# Patient Record
Sex: MALE | Race: WHITE
[De-identification: names, ages, dates, MRNs, and addresses within clinical notes are randomized per-mention and may not be internally consistent; named-entity substitution may affect disease eponyms.]

---

## 2019-01-26 ENCOUNTER — HOSPITAL ENCOUNTER (INPATIENT)
Dept: HOSPITAL 94 - ER | Age: 74
LOS: 8 days | DRG: 207 | End: 2019-02-03
Attending: INTERNAL MEDICINE | Admitting: INTERNAL MEDICINE
Payer: MEDICARE

## 2019-01-26 VITALS — DIASTOLIC BLOOD PRESSURE: 75 MMHG | SYSTOLIC BLOOD PRESSURE: 166 MMHG

## 2019-01-26 VITALS — WEIGHT: 238.32 LBS | HEIGHT: 68 IN | BODY MASS INDEX: 36.12 KG/M2

## 2019-01-26 VITALS — DIASTOLIC BLOOD PRESSURE: 65 MMHG | SYSTOLIC BLOOD PRESSURE: 103 MMHG

## 2019-01-26 VITALS — DIASTOLIC BLOOD PRESSURE: 71 MMHG | SYSTOLIC BLOOD PRESSURE: 156 MMHG

## 2019-01-26 VITALS — DIASTOLIC BLOOD PRESSURE: 62 MMHG | SYSTOLIC BLOOD PRESSURE: 111 MMHG

## 2019-01-26 DIAGNOSIS — I25.10: ICD-10-CM

## 2019-01-26 DIAGNOSIS — R56.9: ICD-10-CM

## 2019-01-26 DIAGNOSIS — S09.90XA: ICD-10-CM

## 2019-01-26 DIAGNOSIS — Z66: ICD-10-CM
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
## 2019-01-26 DIAGNOSIS — I24.9: ICD-10-CM
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
## 2019-01-26 DIAGNOSIS — J96.01: Primary | ICD-10-CM
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
## 2019-01-26 DIAGNOSIS — Z79.84: ICD-10-CM

## 2019-01-26 DIAGNOSIS — I46.9: ICD-10-CM

## 2019-01-26 DIAGNOSIS — M19.90: ICD-10-CM

## 2019-01-26 DIAGNOSIS — E87.6: ICD-10-CM

## 2019-01-26 DIAGNOSIS — Z51.5: ICD-10-CM

## 2019-01-26 DIAGNOSIS — E11.9: ICD-10-CM

## 2019-01-26 DIAGNOSIS — J90: ICD-10-CM
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
## 2019-01-26 DIAGNOSIS — I25.2: ICD-10-CM

## 2019-01-26 DIAGNOSIS — I10: ICD-10-CM

## 2019-01-26 DIAGNOSIS — Z79.899: ICD-10-CM

## 2019-01-26 DIAGNOSIS — Z95.5: ICD-10-CM

## 2019-01-26 DIAGNOSIS — Z79.82: ICD-10-CM

## 2019-01-26 DIAGNOSIS — G93.1: ICD-10-CM

## 2019-01-26 LAB
ALBUMIN SERPL BCP-MCNC: 3.5 G/DL (ref 3.4–5)
ALBUMIN SERPL BCP-MCNC: 3.7 G/DL (ref 3.4–5)
ALBUMIN/GLOB SERPL: 1.1 {RATIO} (ref 1.1–1.5)
ALP SERPL-CCNC: 132 IU/L (ref 46–116)
ALT SERPL W P-5'-P-CCNC: 135 U/L (ref 12–78)
ANION GAP SERPL CALCULATED.3IONS-SCNC: 14 MMOL/L (ref 8–16)
ANION GAP SERPL CALCULATED.3IONS-SCNC: 16 MMOL/L (ref 8–16)
APTT PPP: 28 SECONDS (ref 22–32)
AST SERPL W P-5'-P-CCNC: 128 U/L (ref 10–37)
BASE EXCESS BLDA CALC-SCNC: -10.3 MMOL/L (ref -2–3)
BASE EXCESS BLDA CALC-SCNC: -8 MMOL/L (ref -2–3)
BASOPHILS # BLD AUTO: 0 X10'3 (ref 0–0.2)
BASOPHILS # BLD AUTO: 0 X10'3 (ref 0–0.2)
BASOPHILS NFR BLD AUTO: 0 % (ref 0–1)
BASOPHILS NFR BLD AUTO: 0.2 % (ref 0–1)
BILIRUB SERPL-MCNC: 0.7 MG/DL (ref 0.1–1)
BODY TEMPERATURE: 36.6
BODY TEMPERATURE: 37
BUN SERPL-MCNC: 18 MG/DL (ref 7–18)
BUN SERPL-MCNC: 26 MG/DL (ref 7–18)
BUN/CREAT SERPL: 12.3 (ref 5.4–32)
BUN/CREAT SERPL: 17.6 (ref 5.4–32)
CALCIUM SERPL-MCNC: 8 MG/DL (ref 8.5–10.1)
CALCIUM SERPL-MCNC: 9.1 MG/DL (ref 8.5–10.1)
CHLORIDE SERPL-SCNC: 106 MMOL/L (ref 99–107)
CHLORIDE SERPL-SCNC: 111 MMOL/L (ref 99–107)
CLARITY UR: CLEAR
CO2 SERPL-SCNC: 21 MMOL/L (ref 24–32)
CO2 SERPL-SCNC: 22.2 MMOL/L (ref 24–32)
COHGB MFR BLDA: 0.1 % (ref 0.5–1.5)
COHGB MFR BLDA: 0.2 % (ref 0.5–1.5)
COLOR UR: YELLOW
CREAT SERPL-MCNC: 1.46 MG/DL (ref 0.6–1.1)
CREAT SERPL-MCNC: 1.48 MG/DL (ref 0.6–1.1)
EOSINOPHIL # BLD AUTO: 0.1 X10'3 (ref 0–0.9)
EOSINOPHIL # BLD AUTO: 0.3 X10'3 (ref 0–0.9)
EOSINOPHIL NFR BLD AUTO: 1.1 % (ref 0–6)
EOSINOPHIL NFR BLD AUTO: 1.8 % (ref 0–6)
ERYTHROCYTE [DISTWIDTH] IN BLOOD BY AUTOMATED COUNT: 14 % (ref 11.5–14.5)
ERYTHROCYTE [DISTWIDTH] IN BLOOD BY AUTOMATED COUNT: 14 % (ref 11.5–14.5)
GFR SERPL CREATININE-BSD FRML MDRD: 46 ML/MIN
GFR SERPL CREATININE-BSD FRML MDRD: 47 ML/MIN
GLUCOSE SERPL-MCNC: 152 MG/DL (ref 70–104)
GLUCOSE SERPL-MCNC: 178 MG/DL (ref 70–104)
GLUCOSE UR STRIP-MCNC: NEGATIVE MG/DL
HCO3 BLDA-SCNC: 16.1 MMOL/L (ref 22–26)
HCO3 BLDA-SCNC: 17.9 MMOL/L (ref 22–26)
HCT VFR BLD AUTO: 38.6 % (ref 42–52)
HCT VFR BLD AUTO: 38.9 % (ref 42–52)
HGB BLD-MCNC: 12.5 G/DL (ref 14–17.9)
HGB BLD-MCNC: 12.8 G/DL (ref 14–17.9)
HGB BLDA-MCNC: 12.9 G/DL (ref 14–18)
HGB BLDA-MCNC: 13.7 G/DL (ref 14–18)
HGB UR QL STRIP: NEGATIVE
INHALED O2 FLOW RATE: 15 L/MIN
INR PPP: 1.1 INR
KETONES UR STRIP-MCNC: NEGATIVE MG/DL
LEUKOCYTE ESTERASE UR QL STRIP: NEGATIVE
LYMPHOCYTES # BLD AUTO: 1.7 X10'3 (ref 1.1–4.8)
LYMPHOCYTES # BLD AUTO: 4.8 X10'3 (ref 1.1–4.8)
LYMPHOCYTES NFR BLD AUTO: 34.6 % (ref 21–51)
LYMPHOCYTES NFR BLD AUTO: 8.9 % (ref 21–51)
MAGNESIUM SERPL-MCNC: 1.7 MG/DL (ref 1.5–2.4)
MAGNESIUM SERPL-MCNC: 2.5 MG/DL (ref 1.5–2.4)
MCH RBC QN AUTO: 28.7 PG (ref 27–31)
MCH RBC QN AUTO: 29.1 PG (ref 27–31)
MCHC RBC AUTO-ENTMCNC: 32.4 % (ref 33–36.5)
MCHC RBC AUTO-ENTMCNC: 32.8 % (ref 33–36.5)
MCV RBC AUTO: 88.6 FL (ref 78–98)
MCV RBC AUTO: 88.8 FL (ref 78–98)
METHGB MFR BLDA: 0.1 % (ref 0.3–1.12)
METHGB MFR BLDA: 0.1 % (ref 0.3–1.12)
MONOCYTES # BLD AUTO: 0.6 X10'3 (ref 0–0.9)
MONOCYTES # BLD AUTO: 1.5 X10'3 (ref 0–0.9)
MONOCYTES NFR BLD AUTO: 4.4 % (ref 2–12)
MONOCYTES NFR BLD AUTO: 7.4 % (ref 2–12)
NEUTROPHILS # BLD AUTO: 16 X10'3 (ref 1.8–7.7)
NEUTROPHILS # BLD AUTO: 8.3 X10'3 (ref 1.8–7.7)
NEUTROPHILS NFR BLD AUTO: 59.7 % (ref 42–75)
NEUTROPHILS NFR BLD AUTO: 81.9 % (ref 42–75)
NITRITE UR QL STRIP: NEGATIVE
O2/TOTAL GAS SETTING VFR VENT: 100 MMHG/%
O2/TOTAL GAS SETTING VFR VENT: 50 MMHG/%
OXYHGB MFR BLDA: 97 % (ref 94–100)
OXYHGB MFR BLDA: 99 % (ref 94–100)
PCO2 TEMP ADJ BLDA: 37.3 MMHG (ref 35–48)
PCO2 TEMP ADJ BLDA: 37.5 MMHG (ref 35–48)
PEEP RESPIRATORY: 5 CM H2O
PH TEMP ADJ BLDA: 7.25 [PH] (ref 7.35–7.45)
PH TEMP ADJ BLDA: 7.3 [PH] (ref 7.35–7.45)
PH UR STRIP: 6 [PH] (ref 4.8–8)
PLATELET # BLD AUTO: 208 X10'3 (ref 140–440)
PLATELET # BLD AUTO: 248 X10'3 (ref 140–440)
PMV BLD AUTO: 10.2 FL (ref 7.4–10.4)
PMV BLD AUTO: 9.8 FL (ref 7.4–10.4)
PO2 TEMP ADJ BLD: 107.4 MMHG (ref 83–108)
PO2 TEMP ADJ BLD: 280.1 MMHG (ref 83–108)
PO2 TEMP ADJ BLDMV: 27.3 MMHG (ref 35–46)
POTASSIUM SERPL-SCNC: 4.7 MMOL/L (ref 3.5–5.1)
PROT SERPL-MCNC: 7.1 G/DL (ref 6.4–8.2)
PROT UR QL STRIP: NEGATIVE MG/DL
PROTHROMBIN TIME: 11.4 SECONDS (ref 9–12)
RBC # BLD AUTO: 4.35 X10'6 (ref 4.7–6.1)
RBC # BLD AUTO: 4.38 X10'6 (ref 4.7–6.1)
RESP RATE 1H: 16 B/MIN
RESP RATE RESTING: 16 B/MIN
SAO2 % BLDA: 97.3 % (ref 95–98)
SAO2 % BLDA: 99.2 % (ref 95–98)
SAO2 % BLDMV: 42 % (ref 60–80)
SODIUM SERPL-SCNC: 144 MMOL/L (ref 135–145)
SODIUM SERPL-SCNC: 146 MMOL/L (ref 135–145)
SP GR UR STRIP: 1.02 (ref 1–1.03)
SPONT VT COMPRES GAS VOL SET UNCOR VENT: 400 ML
URN COLLECT METHOD CLASS: (no result)
UROBILINOGEN UR STRIP-MCNC: 0.2 E.U/DL (ref 0.2–1)
VOL.EXP/M/BW ON VENT: 17 L/MIN
WBC # BLD AUTO: 13.8 X10'3 (ref 4.5–11)
WBC # BLD AUTO: 19.6 X10'3 (ref 4.5–11)

## 2019-01-26 PROCEDURE — 85025 COMPLETE CBC W/AUTO DIFF WBC: CPT

## 2019-01-26 PROCEDURE — 86885 COOMBS TEST INDIRECT QUAL: CPT

## 2019-01-26 PROCEDURE — 83036 HEMOGLOBIN GLYCOSYLATED A1C: CPT

## 2019-01-26 PROCEDURE — 36415 COLL VENOUS BLD VENIPUNCTURE: CPT

## 2019-01-26 PROCEDURE — 93306 TTE W/DOPPLER COMPLETE: CPT

## 2019-01-26 PROCEDURE — 84134 ASSAY OF PREALBUMIN: CPT

## 2019-01-26 PROCEDURE — 82803 BLOOD GASES ANY COMBINATION: CPT

## 2019-01-26 PROCEDURE — 82810 BLOOD GASES O2 SAT ONLY: CPT

## 2019-01-26 PROCEDURE — 94760 N-INVAS EAR/PLS OXIMETRY 1: CPT

## 2019-01-26 PROCEDURE — 94003 VENT MGMT INPAT SUBQ DAY: CPT

## 2019-01-26 PROCEDURE — 36600 WITHDRAWAL OF ARTERIAL BLOOD: CPT

## 2019-01-26 PROCEDURE — 84100 ASSAY OF PHOSPHORUS: CPT

## 2019-01-26 PROCEDURE — 02HV33Z INSERTION OF INFUSION DEVICE INTO SUPERIOR VENA CAVA, PERCUTANEOUS APPROACH: ICD-10-PCS | Performed by: EMERGENCY MEDICINE

## 2019-01-26 PROCEDURE — 84443 ASSAY THYROID STIM HORMONE: CPT

## 2019-01-26 PROCEDURE — 99291 CRITICAL CARE FIRST HOUR: CPT

## 2019-01-26 PROCEDURE — 31500 INSERT EMERGENCY AIRWAY: CPT

## 2019-01-26 PROCEDURE — 80048 BASIC METABOLIC PNL TOTAL CA: CPT

## 2019-01-26 PROCEDURE — 87070 CULTURE OTHR SPECIMN AEROBIC: CPT

## 2019-01-26 PROCEDURE — 93005 ELECTROCARDIOGRAM TRACING: CPT

## 2019-01-26 PROCEDURE — 83880 ASSAY OF NATRIURETIC PEPTIDE: CPT

## 2019-01-26 PROCEDURE — 84439 ASSAY OF FREE THYROXINE: CPT

## 2019-01-26 PROCEDURE — 94640 AIRWAY INHALATION TREATMENT: CPT

## 2019-01-26 PROCEDURE — 85610 PROTHROMBIN TIME: CPT

## 2019-01-26 PROCEDURE — 80053 COMPREHEN METABOLIC PANEL: CPT

## 2019-01-26 PROCEDURE — 0BH17EZ INSERTION OF ENDOTRACHEAL AIRWAY INTO TRACHEA, VIA NATURAL OR ARTIFICIAL OPENING: ICD-10-PCS | Performed by: EMERGENCY MEDICINE

## 2019-01-26 PROCEDURE — 86901 BLOOD TYPING SEROLOGIC RH(D): CPT

## 2019-01-26 PROCEDURE — 76604 US EXAM CHEST: CPT

## 2019-01-26 PROCEDURE — 86900 BLOOD TYPING SEROLOGIC ABO: CPT

## 2019-01-26 PROCEDURE — 71045 X-RAY EXAM CHEST 1 VIEW: CPT

## 2019-01-26 PROCEDURE — 95816 EEG AWAKE AND DROWSY: CPT

## 2019-01-26 PROCEDURE — 81003 URINALYSIS AUTO W/O SCOPE: CPT

## 2019-01-26 PROCEDURE — 36556 INSERT NON-TUNNEL CV CATH: CPT

## 2019-01-26 PROCEDURE — 85018 HEMOGLOBIN: CPT

## 2019-01-26 PROCEDURE — 5A1955Z RESPIRATORY VENTILATION, GREATER THAN 96 CONSECUTIVE HOURS: ICD-10-PCS | Performed by: EMERGENCY MEDICINE

## 2019-01-26 PROCEDURE — 85730 THROMBOPLASTIN TIME PARTIAL: CPT

## 2019-01-26 PROCEDURE — 84145 PROCALCITONIN (PCT): CPT

## 2019-01-26 PROCEDURE — 84484 ASSAY OF TROPONIN QUANT: CPT

## 2019-01-26 PROCEDURE — 82948 REAGENT STRIP/BLOOD GLUCOSE: CPT

## 2019-01-26 PROCEDURE — 83605 ASSAY OF LACTIC ACID: CPT

## 2019-01-26 PROCEDURE — 70450 CT HEAD/BRAIN W/O DYE: CPT

## 2019-01-26 PROCEDURE — 87040 BLOOD CULTURE FOR BACTERIA: CPT

## 2019-01-26 PROCEDURE — 94002 VENT MGMT INPAT INIT DAY: CPT

## 2019-01-26 PROCEDURE — 83735 ASSAY OF MAGNESIUM: CPT

## 2019-01-26 RX ADMIN — DOBUTAMINE IN DEXTROSE SCH MLS/HR: 200 INJECTION, SOLUTION INTRAVENOUS at 21:28

## 2019-01-26 RX ADMIN — Medication PRN MLS/HR: at 23:28

## 2019-01-26 RX ADMIN — Medication SCH MLS/HR: at 15:58

## 2019-01-26 RX ADMIN — IPRATROPIUM BROMIDE AND ALBUTEROL SULFATE SCH ML: .5; 3 SOLUTION RESPIRATORY (INHALATION) at 23:10

## 2019-01-26 RX ADMIN — Medication PRN MLS/HR: at 16:57

## 2019-01-26 RX ADMIN — IPRATROPIUM BROMIDE AND ALBUTEROL SULFATE SCH ML: .5; 3 SOLUTION RESPIRATORY (INHALATION) at 19:04

## 2019-01-26 RX ADMIN — SODIUM CHLORIDE SCH MLS/HR: 9 INJECTION INTRAMUSCULAR; INTRAVENOUS; SUBCUTANEOUS at 15:57

## 2019-01-26 RX ADMIN — Medication PRN MLS/HR: at 16:56

## 2019-01-26 RX ADMIN — DOCUSATE SODIUM SCH MG: 100 CAPSULE, LIQUID FILLED ORAL at 20:00

## 2019-01-26 RX ADMIN — SODIUM CHLORIDE SCH MG: 9 INJECTION INTRAMUSCULAR; INTRAVENOUS; SUBCUTANEOUS at 23:08

## 2019-01-26 NOTE — NUR
Received report from ED. PT transferred to CICU via gurney. PT is on sedation and non 
responsive, Hypothermia protocol initiated.

## 2019-01-27 VITALS — SYSTOLIC BLOOD PRESSURE: 95 MMHG | DIASTOLIC BLOOD PRESSURE: 56 MMHG

## 2019-01-27 VITALS — SYSTOLIC BLOOD PRESSURE: 93 MMHG | DIASTOLIC BLOOD PRESSURE: 58 MMHG

## 2019-01-27 VITALS — SYSTOLIC BLOOD PRESSURE: 104 MMHG | DIASTOLIC BLOOD PRESSURE: 66 MMHG

## 2019-01-27 VITALS — SYSTOLIC BLOOD PRESSURE: 95 MMHG | DIASTOLIC BLOOD PRESSURE: 54 MMHG

## 2019-01-27 VITALS — DIASTOLIC BLOOD PRESSURE: 60 MMHG | SYSTOLIC BLOOD PRESSURE: 102 MMHG

## 2019-01-27 VITALS — DIASTOLIC BLOOD PRESSURE: 66 MMHG | SYSTOLIC BLOOD PRESSURE: 112 MMHG

## 2019-01-27 VITALS — DIASTOLIC BLOOD PRESSURE: 64 MMHG | SYSTOLIC BLOOD PRESSURE: 106 MMHG

## 2019-01-27 VITALS — DIASTOLIC BLOOD PRESSURE: 63 MMHG | SYSTOLIC BLOOD PRESSURE: 106 MMHG

## 2019-01-27 VITALS — DIASTOLIC BLOOD PRESSURE: 53 MMHG | SYSTOLIC BLOOD PRESSURE: 87 MMHG

## 2019-01-27 VITALS — DIASTOLIC BLOOD PRESSURE: 39 MMHG | SYSTOLIC BLOOD PRESSURE: 110 MMHG

## 2019-01-27 VITALS — SYSTOLIC BLOOD PRESSURE: 106 MMHG | DIASTOLIC BLOOD PRESSURE: 63 MMHG

## 2019-01-27 VITALS — DIASTOLIC BLOOD PRESSURE: 62 MMHG | SYSTOLIC BLOOD PRESSURE: 107 MMHG

## 2019-01-27 VITALS — DIASTOLIC BLOOD PRESSURE: 52 MMHG | SYSTOLIC BLOOD PRESSURE: 88 MMHG

## 2019-01-27 VITALS — DIASTOLIC BLOOD PRESSURE: 60 MMHG | SYSTOLIC BLOOD PRESSURE: 120 MMHG

## 2019-01-27 VITALS — DIASTOLIC BLOOD PRESSURE: 69 MMHG | SYSTOLIC BLOOD PRESSURE: 96 MMHG

## 2019-01-27 VITALS — DIASTOLIC BLOOD PRESSURE: 54 MMHG | SYSTOLIC BLOOD PRESSURE: 91 MMHG

## 2019-01-27 VITALS — DIASTOLIC BLOOD PRESSURE: 57 MMHG | SYSTOLIC BLOOD PRESSURE: 107 MMHG

## 2019-01-27 VITALS — SYSTOLIC BLOOD PRESSURE: 96 MMHG | DIASTOLIC BLOOD PRESSURE: 70 MMHG

## 2019-01-27 VITALS — DIASTOLIC BLOOD PRESSURE: 64 MMHG | SYSTOLIC BLOOD PRESSURE: 103 MMHG

## 2019-01-27 VITALS — SYSTOLIC BLOOD PRESSURE: 115 MMHG | DIASTOLIC BLOOD PRESSURE: 67 MMHG

## 2019-01-27 VITALS — DIASTOLIC BLOOD PRESSURE: 61 MMHG | SYSTOLIC BLOOD PRESSURE: 105 MMHG

## 2019-01-27 VITALS — DIASTOLIC BLOOD PRESSURE: 62 MMHG | SYSTOLIC BLOOD PRESSURE: 114 MMHG

## 2019-01-27 VITALS — SYSTOLIC BLOOD PRESSURE: 91 MMHG | DIASTOLIC BLOOD PRESSURE: 59 MMHG

## 2019-01-27 VITALS — SYSTOLIC BLOOD PRESSURE: 104 MMHG | DIASTOLIC BLOOD PRESSURE: 58 MMHG

## 2019-01-27 VITALS — SYSTOLIC BLOOD PRESSURE: 102 MMHG | DIASTOLIC BLOOD PRESSURE: 60 MMHG

## 2019-01-27 VITALS — DIASTOLIC BLOOD PRESSURE: 52 MMHG | SYSTOLIC BLOOD PRESSURE: 91 MMHG

## 2019-01-27 VITALS — SYSTOLIC BLOOD PRESSURE: 100 MMHG | DIASTOLIC BLOOD PRESSURE: 53 MMHG

## 2019-01-27 VITALS — DIASTOLIC BLOOD PRESSURE: 46 MMHG | SYSTOLIC BLOOD PRESSURE: 86 MMHG

## 2019-01-27 LAB
ALBUMIN SERPL BCP-MCNC: 2.9 G/DL (ref 3.4–5)
ALBUMIN SERPL BCP-MCNC: 3.1 G/DL (ref 3.4–5)
ALBUMIN SERPL BCP-MCNC: 3.1 G/DL (ref 3.4–5)
ALBUMIN SERPL BCP-MCNC: 3.3 G/DL (ref 3.4–5)
ANION GAP SERPL CALCULATED.3IONS-SCNC: 10 MMOL/L (ref 8–16)
ANION GAP SERPL CALCULATED.3IONS-SCNC: 12 MMOL/L (ref 8–16)
ANION GAP SERPL CALCULATED.3IONS-SCNC: 13 MMOL/L (ref 8–16)
ANION GAP SERPL CALCULATED.3IONS-SCNC: 9 MMOL/L (ref 8–16)
APTT PPP: 44 SECONDS (ref 22–32)
BASE EXCESS BLDA CALC-SCNC: -5 MMOL/L (ref -2–3)
BASE EXCESS BLDA CALC-SCNC: -5 MMOL/L (ref -2–3)
BASE EXCESS BLDA CALC-SCNC: -6.4 MMOL/L (ref -2–3)
BASOPHILS # BLD AUTO: 0 X10'3 (ref 0–0.2)
BASOPHILS NFR BLD AUTO: 0 % (ref 0–1)
BASOPHILS NFR BLD AUTO: 0.1 % (ref 0–1)
BASOPHILS NFR BLD AUTO: 0.2 % (ref 0–1)
BODY TEMPERATURE: 31.5
BODY TEMPERATURE: 32.4
BODY TEMPERATURE: 32.8
BUN SERPL-MCNC: 18 MG/DL (ref 7–18)
BUN SERPL-MCNC: 19 MG/DL (ref 7–18)
BUN SERPL-MCNC: 21 MG/DL (ref 7–18)
BUN SERPL-MCNC: 22 MG/DL (ref 7–18)
BUN/CREAT SERPL: 18.4 (ref 5.4–32)
BUN/CREAT SERPL: 19.1 (ref 5.4–32)
BUN/CREAT SERPL: 19.6 (ref 5.4–32)
BUN/CREAT SERPL: 20.2 (ref 5.4–32)
CALCIUM SERPL-MCNC: 7.6 MG/DL (ref 8.5–10.1)
CALCIUM SERPL-MCNC: 7.8 MG/DL (ref 8.5–10.1)
CALCIUM SERPL-MCNC: 7.8 MG/DL (ref 8.5–10.1)
CALCIUM SERPL-MCNC: 7.9 MG/DL (ref 8.5–10.1)
CHLORIDE SERPL-SCNC: 107 MMOL/L (ref 99–107)
CHLORIDE SERPL-SCNC: 109 MMOL/L (ref 99–107)
CHLORIDE SERPL-SCNC: 109 MMOL/L (ref 99–107)
CHLORIDE SERPL-SCNC: 110 MMOL/L (ref 99–107)
CO2 SERPL-SCNC: 21.3 MMOL/L (ref 24–32)
CO2 SERPL-SCNC: 22.7 MMOL/L (ref 24–32)
CO2 SERPL-SCNC: 22.9 MMOL/L (ref 24–32)
CO2 SERPL-SCNC: 23 MMOL/L (ref 24–32)
COHGB MFR BLDA: 0.1 % (ref 0.5–1.5)
COHGB MFR BLDA: 0.2 % (ref 0.5–1.5)
COHGB MFR BLDA: 0.3 % (ref 0.5–1.5)
CREAT SERPL-MCNC: 0.94 MG/DL (ref 0.6–1.1)
CREAT SERPL-MCNC: 0.98 MG/DL (ref 0.6–1.1)
CREAT SERPL-MCNC: 1.1 MG/DL (ref 0.6–1.1)
CREAT SERPL-MCNC: 1.12 MG/DL (ref 0.6–1.1)
EOSINOPHIL # BLD AUTO: 0.1 X10'3 (ref 0–0.9)
EOSINOPHIL # BLD AUTO: 0.1 X10'3 (ref 0–0.9)
EOSINOPHIL # BLD AUTO: 0.2 X10'3 (ref 0–0.9)
EOSINOPHIL NFR BLD AUTO: 0.8 % (ref 0–6)
EOSINOPHIL NFR BLD AUTO: 1.5 % (ref 0–6)
EOSINOPHIL NFR BLD AUTO: 1.6 % (ref 0–6)
ERYTHROCYTE [DISTWIDTH] IN BLOOD BY AUTOMATED COUNT: 13.6 % (ref 11.5–14.5)
ERYTHROCYTE [DISTWIDTH] IN BLOOD BY AUTOMATED COUNT: 13.8 % (ref 11.5–14.5)
ERYTHROCYTE [DISTWIDTH] IN BLOOD BY AUTOMATED COUNT: 14.1 % (ref 11.5–14.5)
GFR SERPL CREATININE-BSD FRML MDRD: 64 ML/MIN
GFR SERPL CREATININE-BSD FRML MDRD: 65 ML/MIN
GFR SERPL CREATININE-BSD FRML MDRD: 75 ML/MIN
GFR SERPL CREATININE-BSD FRML MDRD: 78 ML/MIN
GLUCOSE SERPL-MCNC: 151 MG/DL (ref 70–104)
GLUCOSE SERPL-MCNC: 155 MG/DL (ref 70–104)
GLUCOSE SERPL-MCNC: 162 MG/DL (ref 70–104)
GLUCOSE SERPL-MCNC: 166 MG/DL (ref 70–104)
HCO3 BLDA-SCNC: 21.3 MMOL/L (ref 22–26)
HCO3 BLDA-SCNC: 21.9 MMOL/L (ref 22–26)
HCO3 BLDA-SCNC: 21.9 MMOL/L (ref 22–26)
HCT VFR BLD AUTO: 35.4 % (ref 42–52)
HCT VFR BLD AUTO: 35.8 % (ref 42–52)
HCT VFR BLD AUTO: 39.2 % (ref 42–52)
HGB BLD-MCNC: 11.7 G/DL (ref 14–17.9)
HGB BLD-MCNC: 11.9 G/DL (ref 14–17.9)
HGB BLD-MCNC: 12.8 G/DL (ref 14–17.9)
HGB BLDA-MCNC: 12.6 G/DL (ref 14–18)
HGB BLDA-MCNC: 12.8 G/DL (ref 14–18)
HGB BLDA-MCNC: 13.5 G/DL (ref 14–18)
INR PPP: 1.3 INR
LYMPHOCYTES # BLD AUTO: 1 X10'3 (ref 1.1–4.8)
LYMPHOCYTES # BLD AUTO: 1.1 X10'3 (ref 1.1–4.8)
LYMPHOCYTES # BLD AUTO: 1.2 X10'3 (ref 1.1–4.8)
LYMPHOCYTES NFR BLD AUTO: 10.1 % (ref 21–51)
LYMPHOCYTES NFR BLD AUTO: 12 % (ref 21–51)
LYMPHOCYTES NFR BLD AUTO: 8.5 % (ref 21–51)
MAGNESIUM SERPL-MCNC: 1.5 MG/DL (ref 1.5–2.4)
MAGNESIUM SERPL-MCNC: 1.6 MG/DL (ref 1.5–2.4)
MCH RBC QN AUTO: 28.8 PG (ref 27–31)
MCH RBC QN AUTO: 29 PG (ref 27–31)
MCH RBC QN AUTO: 29.1 PG (ref 27–31)
MCHC RBC AUTO-ENTMCNC: 32.6 % (ref 33–36.5)
MCHC RBC AUTO-ENTMCNC: 32.9 % (ref 33–36.5)
MCHC RBC AUTO-ENTMCNC: 33.2 % (ref 33–36.5)
MCV RBC AUTO: 87.7 FL (ref 78–98)
MCV RBC AUTO: 88.1 FL (ref 78–98)
MCV RBC AUTO: 88.2 FL (ref 78–98)
METHGB MFR BLDA: 0 % (ref 0.3–1.12)
METHGB MFR BLDA: 0.3 % (ref 0.3–1.12)
METHGB MFR BLDA: 0.3 % (ref 0.3–1.12)
MONOCYTES # BLD AUTO: 0.6 X10'3 (ref 0–0.9)
MONOCYTES # BLD AUTO: 0.8 X10'3 (ref 0–0.9)
MONOCYTES # BLD AUTO: 0.8 X10'3 (ref 0–0.9)
MONOCYTES NFR BLD AUTO: 6.7 % (ref 2–12)
MONOCYTES NFR BLD AUTO: 7.2 % (ref 2–12)
MONOCYTES NFR BLD AUTO: 7.2 % (ref 2–12)
NEUTROPHILS # BLD AUTO: 7.1 X10'3 (ref 1.8–7.7)
NEUTROPHILS # BLD AUTO: 9.3 X10'3 (ref 1.8–7.7)
NEUTROPHILS # BLD AUTO: 9.5 X10'3 (ref 1.8–7.7)
NEUTROPHILS NFR BLD AUTO: 79.2 % (ref 42–75)
NEUTROPHILS NFR BLD AUTO: 82.4 % (ref 42–75)
NEUTROPHILS NFR BLD AUTO: 82.5 % (ref 42–75)
O2/TOTAL GAS SETTING VFR VENT: 50 MMHG/%
O2/TOTAL GAS SETTING VFR VENT: 60 MMHG/%
O2/TOTAL GAS SETTING VFR VENT: 60 MMHG/%
OXYHGB MFR BLDA: 93.6 % (ref 94–100)
OXYHGB MFR BLDA: 94.9 % (ref 94–100)
OXYHGB MFR BLDA: 97 % (ref 94–100)
PCO2 TEMP ADJ BLDA: 39.1 MMHG (ref 35–48)
PCO2 TEMP ADJ BLDA: 39.7 MMHG (ref 35–48)
PCO2 TEMP ADJ BLDA: 40 MMHG (ref 35–48)
PEEP RESPIRATORY: 5 CM H2O
PH TEMP ADJ BLDA: 7.31 [PH] (ref 7.35–7.45)
PH TEMP ADJ BLDA: 7.34 [PH] (ref 7.35–7.45)
PH TEMP ADJ BLDA: 7.34 [PH] (ref 7.35–7.45)
PHOSPHATE SERPL-MCNC: 3.2 MG/DL (ref 2.3–4.5)
PHOSPHATE SERPL-MCNC: 3.5 MG/DL (ref 2.3–4.5)
PHOSPHATE SERPL-MCNC: 3.9 MG/DL (ref 2.3–4.5)
PLATELET # BLD AUTO: 116 X10'3 (ref 140–440)
PLATELET # BLD AUTO: 126 X10'3 (ref 140–440)
PLATELET # BLD AUTO: 136 X10'3 (ref 140–440)
PMV BLD AUTO: 8.8 FL (ref 7.4–10.4)
PMV BLD AUTO: 9.7 FL (ref 7.4–10.4)
PMV BLD AUTO: 9.9 FL (ref 7.4–10.4)
PO2 TEMP ADJ BLD: 58.3 MMHG (ref 83–108)
PO2 TEMP ADJ BLD: 62.9 MMHG (ref 83–108)
PO2 TEMP ADJ BLD: 90.1 MMHG (ref 83–108)
PO2 TEMP ADJ BLDMV: 29.2 MMHG (ref 35–46)
PO2 TEMP ADJ BLDMV: 33.4 MMHG (ref 35–46)
PO2 TEMP ADJ BLDMV: 42.1 MMHG (ref 35–46)
POTASSIUM SERPL-SCNC: 2.8 MMOL/L (ref 3.5–5.1)
POTASSIUM SERPL-SCNC: 3.2 MMOL/L (ref 3.5–5.1)
POTASSIUM SERPL-SCNC: 3.3 MMOL/L (ref 3.5–5.1)
POTASSIUM SERPL-SCNC: 3.4 MMOL/L (ref 3.5–5.1)
POTASSIUM SERPL-SCNC: 3.8 MMOL/L (ref 3.5–5.1)
PROTHROMBIN TIME: 13.2 SECONDS (ref 9–12)
RBC # BLD AUTO: 4.02 X10'6 (ref 4.7–6.1)
RBC # BLD AUTO: 4.08 X10'6 (ref 4.7–6.1)
RBC # BLD AUTO: 4.44 X10'6 (ref 4.7–6.1)
RESP RATE 1H: 14 B/MIN
RESP RATE 1H: 14 B/MIN
RESP RATE 1H: 16 B/MIN
RESP RATE RESTING: 14 B/MIN
RESP RATE RESTING: 16 B/MIN
SAO2 % BLDA: 94 % (ref 95–98)
SAO2 % BLDA: 95.1 % (ref 95–98)
SAO2 % BLDA: 97.6 % (ref 95–98)
SAO2 % BLDMV: 69.4 % (ref 60–80)
SAO2 % BLDMV: 74.8 % (ref 60–80)
SAO2 % BLDMV: 84.9 % (ref 60–80)
SODIUM SERPL-SCNC: 141 MMOL/L (ref 135–145)
SODIUM SERPL-SCNC: 142 MMOL/L (ref 135–145)
SODIUM SERPL-SCNC: 142 MMOL/L (ref 135–145)
SODIUM SERPL-SCNC: 144 MMOL/L (ref 135–145)
SPONT VT COMPRES GAS VOL SET UNCOR VENT: 400 ML
TROPONIN I SERPL-MCNC: 1.97 NG/ML (ref 0–0.05)
TROPONIN I SERPL-MCNC: 2.42 NG/ML (ref 0–0.05)
TROPONIN I SERPL-MCNC: 5.01 NG/ML (ref 0–0.05)
VOL.EXP/M/BW ON VENT: 10 L/MIN
VOL.EXP/M/BW ON VENT: 7 L/MIN
VOL.EXP/M/BW ON VENT: 9 L/MIN
WBC # BLD AUTO: 11.3 X10'3 (ref 4.5–11)
WBC # BLD AUTO: 11.5 X10'3 (ref 4.5–11)
WBC # BLD AUTO: 9 X10'3 (ref 4.5–11)

## 2019-01-27 RX ADMIN — IPRATROPIUM BROMIDE AND ALBUTEROL SULFATE SCH ML: .5; 3 SOLUTION RESPIRATORY (INHALATION) at 20:23

## 2019-01-27 RX ADMIN — IPRATROPIUM BROMIDE AND ALBUTEROL SULFATE SCH ML: .5; 3 SOLUTION RESPIRATORY (INHALATION) at 06:58

## 2019-01-27 RX ADMIN — SODIUM CHLORIDE SCH MLS/HR: 9 INJECTION INTRAMUSCULAR; INTRAVENOUS; SUBCUTANEOUS at 18:20

## 2019-01-27 RX ADMIN — HEPARIN SODIUM AND DEXTROSE SCH MLS/HR: 10000; 5 INJECTION INTRAVENOUS at 01:14

## 2019-01-27 RX ADMIN — IPRATROPIUM BROMIDE AND ALBUTEROL SULFATE SCH ML: .5; 3 SOLUTION RESPIRATORY (INHALATION) at 11:38

## 2019-01-27 RX ADMIN — Medication PRN MLS/HR: at 04:22

## 2019-01-27 RX ADMIN — SODIUM CHLORIDE SCH MLS/HR: 9 INJECTION INTRAMUSCULAR; INTRAVENOUS; SUBCUTANEOUS at 04:18

## 2019-01-27 RX ADMIN — IPRATROPIUM BROMIDE AND ALBUTEROL SULFATE SCH ML: .5; 3 SOLUTION RESPIRATORY (INHALATION) at 03:18

## 2019-01-27 RX ADMIN — Medication SCH MLS/HR: at 19:26

## 2019-01-27 RX ADMIN — IPRATROPIUM BROMIDE AND ALBUTEROL SULFATE SCH ML: .5; 3 SOLUTION RESPIRATORY (INHALATION) at 23:22

## 2019-01-27 RX ADMIN — IPRATROPIUM BROMIDE AND ALBUTEROL SULFATE SCH ML: .5; 3 SOLUTION RESPIRATORY (INHALATION) at 15:17

## 2019-01-27 RX ADMIN — DOCUSATE SODIUM SCH MG: 100 CAPSULE, LIQUID FILLED ORAL at 08:00

## 2019-01-27 RX ADMIN — DOCUSATE SODIUM SCH MG: 100 CAPSULE, LIQUID FILLED ORAL at 19:28

## 2019-01-27 RX ADMIN — Medication PRN MLS/HR: at 07:44

## 2019-01-27 RX ADMIN — DOBUTAMINE IN DEXTROSE SCH MLS/HR: 200 INJECTION, SOLUTION INTRAVENOUS at 11:00

## 2019-01-27 RX ADMIN — SODIUM CHLORIDE SCH MG: 9 INJECTION INTRAMUSCULAR; INTRAVENOUS; SUBCUTANEOUS at 08:00

## 2019-01-27 RX ADMIN — SODIUM CHLORIDE SCH MG: 9 INJECTION INTRAMUSCULAR; INTRAVENOUS; SUBCUTANEOUS at 19:26

## 2019-01-27 RX ADMIN — Medication PRN MLS/HR: at 10:50

## 2019-01-27 RX ADMIN — Medication PRN MLS/HR: at 14:28

## 2019-01-27 RX ADMIN — MINERAL OIL, PETROLATUM SCH INCH: 425; 568 OINTMENT OPHTHALMIC at 19:26

## 2019-01-27 NOTE — NUR
Pt has converted to atrial fibrillation rate is controlled in the 70's. reviewed 
electrolytes Will replace potassium, January JOÃO Lackey was notified. no new orders at this 
time.

## 2019-01-27 NOTE — NUR
Farrah (daughter who flew in from [facility]) at bedside with patient. requesting pt belongings. In 
pt bag there was a half of a ham sandwich, 2 sets of keys, jeans, a belt, underwear, and 
shoes. Farrah took 2 sets of keys (one of them to the pts truck per Farrah).

## 2019-01-27 NOTE — NUR
pt stacking breaths on vent, heart rate increased. Required several bolus and increases of 
rate of versed. increased rate to 18 mg/hr prior to turning on paralytic. Dr. Domingo aware 
pt required large doses of versed and requested RN to increase rate and bolus pt

## 2019-01-27 NOTE — NUR
pt heart rate is 130-150. md notified. orders for amiodarone received. addressed patients 
declining urine output- no change in orders at this time. output of 10-20 okay.

## 2019-01-27 NOTE — NUR
RN noted pt temp increased to 33.2 at 845 am. RN turned cooling blanket back on. pt heart 
rate and blood pressure increased. deep suctioned pt and got thick yellow secretions out RN 
noted positive gag. RN contacted RT d/t high peak pressures. RT to bedside to lavage. Pt not 
tolerating vent, resp rate increased to greater than 20. RT changed vent settings. and 
turned on humidification in attempts to break up secretions and/or if pt had mucus plug it 
would be able to be evacuated after humidification was added. Pt requiring increase of 
sedation. BIS monitor placed on patient. after increasing fentanyl and versed, RN restarted 
nimbex gtt for micro shivering. pt temp reached 34 degrees. after placing pt on nimbex 
temperature decreased to 33.2 and BIS monitor decreased. at 1100 pt temp back down to 33.2. 
cooling blanket turned off and nimbex gtt on hold in attempt to prevent overshooting goal 
temp. RN titrating down versed and fentanyl d/t decreased reading on BIS monitor.  Versed 
now at 10mg/hr and fentanyl at 100mcg/hr family at bedside, RN will continue to monitor

## 2019-01-27 NOTE — NUR
family at bedside. educated family on hypothermia protocol. questions answered. Family is 
going to attempt to find out exactly what happened yesterday. Per family report he was 
repairing a fence at a rental property. Made a phone call at 11am, and are unaware of what 
happened between that time and his arrival at [facility]. pt hypotensive at this time (see VS flow 
sheet) started levophed to keep MAP greater than 65. RN will continue to monitor

-------------------------------------------------------------------------------

Addendum: 01/27/19 at 1450 by Ramon Valencia RN

-------------------------------------------------------------------------------

Update from Julieth (daughter):

Pt made phone call 1/26/19 @ 1119, per caller pt seemed ok.

1220: Naty of [employer] saw patient working on fence

1328: bystander found patient down, called 911, and started CPR

1411: pt admitted to ED

## 2019-01-27 NOTE — NUR
contacted [facility]. Reference number [id number]. Spoke with Carlos. Per [facility] 
pt is on [place] registry and [facility]or will follow pt. Instructed to report if family 
decides to withdrawal care or pt deteriorates.

## 2019-01-27 NOTE — NUR
cooling blankets removed. rewarming phase initiated. pt temp 31.2 c. blankets and socks 
applied, room temperature increased, ventilator heater turned on. will continue to monitor

## 2019-01-27 NOTE — NUR
RN notes shivering and temperature increasing. increased nimbex gtt (see flow sheet). 
cooling blanket placed back on.

## 2019-01-27 NOTE — NUR
Problems reprioritized. Patient report given, questions answered & plan of care reviewed 
with IRVING Balderrama.

## 2019-01-27 NOTE — NUR
Initial: Pt intubated s/p code suspected to be down for 2 hours w/ 30min 

CPR prior to admit per MD note. Currently unresponsive w/ hypothermia 

protocol. Hx cardiac stents; T2DM last A1C 7.3 2014; will need new draw 

once stable. James 9 w/ general +1 trace edema and skin intact. OG in 

place w/ MAP 88. Colace held given NPO/rewarming. Will monitor for 

nutrition support needs. 

Rec:

1. IF TF; Vital HP at 95ml/hr goal

2. IF TF; prealbumin Q M/Th, daily wts

3. New A1C once stable prior to d/c; hx T2DM last A1C 2014

-------------------------------------------------------------------------------

Addendum: 01/27/19 at 1028 by Butch Gifford RD

-------------------------------------------------------------------------------

Amended: Links added.

## 2019-01-28 VITALS — SYSTOLIC BLOOD PRESSURE: 116 MMHG | DIASTOLIC BLOOD PRESSURE: 65 MMHG

## 2019-01-28 VITALS — SYSTOLIC BLOOD PRESSURE: 117 MMHG | DIASTOLIC BLOOD PRESSURE: 64 MMHG

## 2019-01-28 VITALS — DIASTOLIC BLOOD PRESSURE: 63 MMHG | SYSTOLIC BLOOD PRESSURE: 110 MMHG

## 2019-01-28 VITALS — SYSTOLIC BLOOD PRESSURE: 99 MMHG | DIASTOLIC BLOOD PRESSURE: 58 MMHG

## 2019-01-28 VITALS — SYSTOLIC BLOOD PRESSURE: 103 MMHG | DIASTOLIC BLOOD PRESSURE: 55 MMHG

## 2019-01-28 VITALS — SYSTOLIC BLOOD PRESSURE: 120 MMHG | DIASTOLIC BLOOD PRESSURE: 70 MMHG

## 2019-01-28 VITALS — DIASTOLIC BLOOD PRESSURE: 58 MMHG | SYSTOLIC BLOOD PRESSURE: 105 MMHG

## 2019-01-28 VITALS — SYSTOLIC BLOOD PRESSURE: 112 MMHG | DIASTOLIC BLOOD PRESSURE: 63 MMHG

## 2019-01-28 VITALS — DIASTOLIC BLOOD PRESSURE: 51 MMHG | SYSTOLIC BLOOD PRESSURE: 99 MMHG

## 2019-01-28 VITALS — DIASTOLIC BLOOD PRESSURE: 63 MMHG | SYSTOLIC BLOOD PRESSURE: 111 MMHG

## 2019-01-28 VITALS — SYSTOLIC BLOOD PRESSURE: 106 MMHG | DIASTOLIC BLOOD PRESSURE: 55 MMHG

## 2019-01-28 VITALS — SYSTOLIC BLOOD PRESSURE: 104 MMHG | DIASTOLIC BLOOD PRESSURE: 68 MMHG

## 2019-01-28 VITALS — DIASTOLIC BLOOD PRESSURE: 59 MMHG | SYSTOLIC BLOOD PRESSURE: 118 MMHG

## 2019-01-28 VITALS — DIASTOLIC BLOOD PRESSURE: 72 MMHG | SYSTOLIC BLOOD PRESSURE: 109 MMHG

## 2019-01-28 VITALS — SYSTOLIC BLOOD PRESSURE: 98 MMHG | DIASTOLIC BLOOD PRESSURE: 59 MMHG

## 2019-01-28 VITALS — DIASTOLIC BLOOD PRESSURE: 70 MMHG | SYSTOLIC BLOOD PRESSURE: 119 MMHG

## 2019-01-28 VITALS — SYSTOLIC BLOOD PRESSURE: 118 MMHG | DIASTOLIC BLOOD PRESSURE: 65 MMHG

## 2019-01-28 VITALS — DIASTOLIC BLOOD PRESSURE: 68 MMHG | SYSTOLIC BLOOD PRESSURE: 119 MMHG

## 2019-01-28 VITALS — SYSTOLIC BLOOD PRESSURE: 93 MMHG | DIASTOLIC BLOOD PRESSURE: 61 MMHG

## 2019-01-28 VITALS — DIASTOLIC BLOOD PRESSURE: 64 MMHG | SYSTOLIC BLOOD PRESSURE: 105 MMHG

## 2019-01-28 VITALS — SYSTOLIC BLOOD PRESSURE: 96 MMHG | DIASTOLIC BLOOD PRESSURE: 50 MMHG

## 2019-01-28 VITALS — DIASTOLIC BLOOD PRESSURE: 55 MMHG | SYSTOLIC BLOOD PRESSURE: 81 MMHG

## 2019-01-28 VITALS — DIASTOLIC BLOOD PRESSURE: 62 MMHG | SYSTOLIC BLOOD PRESSURE: 113 MMHG

## 2019-01-28 VITALS — SYSTOLIC BLOOD PRESSURE: 97 MMHG | DIASTOLIC BLOOD PRESSURE: 67 MMHG

## 2019-01-28 VITALS — SYSTOLIC BLOOD PRESSURE: 98 MMHG | DIASTOLIC BLOOD PRESSURE: 61 MMHG

## 2019-01-28 VITALS — SYSTOLIC BLOOD PRESSURE: 83 MMHG | DIASTOLIC BLOOD PRESSURE: 64 MMHG

## 2019-01-28 VITALS — DIASTOLIC BLOOD PRESSURE: 64 MMHG | SYSTOLIC BLOOD PRESSURE: 106 MMHG

## 2019-01-28 VITALS — SYSTOLIC BLOOD PRESSURE: 102 MMHG | DIASTOLIC BLOOD PRESSURE: 45 MMHG

## 2019-01-28 LAB
ALBUMIN SERPL BCP-MCNC: 2.7 G/DL (ref 3.4–5)
ALBUMIN SERPL BCP-MCNC: 2.8 G/DL (ref 3.4–5)
ALBUMIN SERPL BCP-MCNC: 2.8 G/DL (ref 3.4–5)
ALBUMIN SERPL BCP-MCNC: 2.9 G/DL (ref 3.4–5)
ALBUMIN/GLOB SERPL: 0.9 {RATIO} (ref 1.1–1.5)
ALP SERPL-CCNC: 101 IU/L (ref 46–116)
ALT SERPL W P-5'-P-CCNC: 105 U/L (ref 12–78)
ANION GAP SERPL CALCULATED.3IONS-SCNC: 10 MMOL/L (ref 8–16)
ANION GAP SERPL CALCULATED.3IONS-SCNC: 10 MMOL/L (ref 8–16)
ANION GAP SERPL CALCULATED.3IONS-SCNC: 11 MMOL/L (ref 8–16)
ANION GAP SERPL CALCULATED.3IONS-SCNC: 9 MMOL/L (ref 8–16)
AST SERPL W P-5'-P-CCNC: 112 U/L (ref 10–37)
BASE EXCESS BLDA CALC-SCNC: -7.1 MMOL/L (ref -2–3)
BASOPHILS # BLD AUTO: 0 X10'3 (ref 0–0.2)
BASOPHILS NFR BLD AUTO: 0 % (ref 0–1)
BASOPHILS NFR BLD AUTO: 0.3 % (ref 0–1)
BILIRUB SERPL-MCNC: 0.9 MG/DL (ref 0.1–1)
BODY TEMPERATURE: 35.4
BUN SERPL-MCNC: 19 MG/DL (ref 7–18)
BUN SERPL-MCNC: 19 MG/DL (ref 7–18)
BUN SERPL-MCNC: 22 MG/DL (ref 7–18)
BUN SERPL-MCNC: 24 MG/DL (ref 7–18)
BUN/CREAT SERPL: 12.7 (ref 5.4–32)
BUN/CREAT SERPL: 14 (ref 5.4–32)
BUN/CREAT SERPL: 14.5 (ref 5.4–32)
BUN/CREAT SERPL: 18.6 (ref 5.4–32)
CALCIUM SERPL-MCNC: 7.6 MG/DL (ref 8.5–10.1)
CALCIUM SERPL-MCNC: 7.7 MG/DL (ref 8.5–10.1)
CALCIUM SERPL-MCNC: 8.1 MG/DL (ref 8.5–10.1)
CALCIUM SERPL-MCNC: 8.1 MG/DL (ref 8.5–10.1)
CHLORIDE SERPL-SCNC: 108 MMOL/L (ref 99–107)
CHLORIDE SERPL-SCNC: 109 MMOL/L (ref 99–107)
CHLORIDE SERPL-SCNC: 109 MMOL/L (ref 99–107)
CHLORIDE SERPL-SCNC: 110 MMOL/L (ref 99–107)
CO2 SERPL-SCNC: 21.8 MMOL/L (ref 24–32)
CO2 SERPL-SCNC: 22.4 MMOL/L (ref 24–32)
CO2 SERPL-SCNC: 22.9 MMOL/L (ref 24–32)
CO2 SERPL-SCNC: 22.9 MMOL/L (ref 24–32)
COHGB MFR BLDA: 0.3 % (ref 0.5–1.5)
CREAT SERPL-MCNC: 1.02 MG/DL (ref 0.6–1.1)
CREAT SERPL-MCNC: 1.31 MG/DL (ref 0.6–1.1)
CREAT SERPL-MCNC: 1.71 MG/DL (ref 0.6–1.1)
CREAT SERPL-MCNC: 1.73 MG/DL (ref 0.6–1.1)
EOSINOPHIL # BLD AUTO: 0.1 X10'3 (ref 0–0.9)
EOSINOPHIL # BLD AUTO: 0.2 X10'3 (ref 0–0.9)
EOSINOPHIL NFR BLD AUTO: 0.9 % (ref 0–6)
EOSINOPHIL NFR BLD AUTO: 1.2 % (ref 0–6)
EOSINOPHIL NFR BLD AUTO: 1.3 % (ref 0–6)
EOSINOPHIL NFR BLD AUTO: 1.3 % (ref 0–6)
ERYTHROCYTE [DISTWIDTH] IN BLOOD BY AUTOMATED COUNT: 14 % (ref 11.5–14.5)
ERYTHROCYTE [DISTWIDTH] IN BLOOD BY AUTOMATED COUNT: 14.1 % (ref 11.5–14.5)
ERYTHROCYTE [DISTWIDTH] IN BLOOD BY AUTOMATED COUNT: 14.2 % (ref 11.5–14.5)
ERYTHROCYTE [DISTWIDTH] IN BLOOD BY AUTOMATED COUNT: 14.2 % (ref 11.5–14.5)
GFR SERPL CREATININE-BSD FRML MDRD: 39 ML/MIN
GFR SERPL CREATININE-BSD FRML MDRD: 39 ML/MIN
GFR SERPL CREATININE-BSD FRML MDRD: 53 ML/MIN
GFR SERPL CREATININE-BSD FRML MDRD: 71 ML/MIN
GLUCOSE SERPL-MCNC: 118 MG/DL (ref 70–104)
GLUCOSE SERPL-MCNC: 124 MG/DL (ref 70–104)
GLUCOSE SERPL-MCNC: 157 MG/DL (ref 70–104)
GLUCOSE SERPL-MCNC: 176 MG/DL (ref 70–104)
HCO3 BLDA-SCNC: 18.6 MMOL/L (ref 22–26)
HCT VFR BLD AUTO: 33.9 % (ref 42–52)
HCT VFR BLD AUTO: 35.4 % (ref 42–52)
HCT VFR BLD AUTO: 36.3 % (ref 42–52)
HCT VFR BLD AUTO: 38.3 % (ref 42–52)
HGB BLD-MCNC: 11.3 G/DL (ref 14–17.9)
HGB BLD-MCNC: 11.6 G/DL (ref 14–17.9)
HGB BLD-MCNC: 11.9 G/DL (ref 14–17.9)
HGB BLD-MCNC: 12.4 G/DL (ref 14–17.9)
HGB BLDA-MCNC: 13.4 G/DL (ref 14–18)
LYMPHOCYTES # BLD AUTO: 0.6 X10'3 (ref 1.1–4.8)
LYMPHOCYTES # BLD AUTO: 0.7 X10'3 (ref 1.1–4.8)
LYMPHOCYTES # BLD AUTO: 0.7 X10'3 (ref 1.1–4.8)
LYMPHOCYTES # BLD AUTO: 1 X10'3 (ref 1.1–4.8)
LYMPHOCYTES NFR BLD AUTO: 5.7 % (ref 21–51)
LYMPHOCYTES NFR BLD AUTO: 5.9 % (ref 21–51)
LYMPHOCYTES NFR BLD AUTO: 6.2 % (ref 21–51)
LYMPHOCYTES NFR BLD AUTO: 8 % (ref 21–51)
MAGNESIUM SERPL-MCNC: 1.4 MG/DL (ref 1.5–2.4)
MAGNESIUM SERPL-MCNC: 1.4 MG/DL (ref 1.5–2.4)
MAGNESIUM SERPL-MCNC: 2.3 MG/DL (ref 1.5–2.4)
MAGNESIUM SERPL-MCNC: 2.4 MG/DL (ref 1.5–2.4)
MCH RBC QN AUTO: 28.2 PG (ref 27–31)
MCH RBC QN AUTO: 28.7 PG (ref 27–31)
MCH RBC QN AUTO: 29 PG (ref 27–31)
MCH RBC QN AUTO: 29.1 PG (ref 27–31)
MCHC RBC AUTO-ENTMCNC: 32.3 % (ref 33–36.5)
MCHC RBC AUTO-ENTMCNC: 32.6 % (ref 33–36.5)
MCHC RBC AUTO-ENTMCNC: 32.8 % (ref 33–36.5)
MCHC RBC AUTO-ENTMCNC: 33.2 % (ref 33–36.5)
MCV RBC AUTO: 87.4 FL (ref 78–98)
MCV RBC AUTO: 87.6 FL (ref 78–98)
MCV RBC AUTO: 87.8 FL (ref 78–98)
MCV RBC AUTO: 88.4 FL (ref 78–98)
METHGB MFR BLDA: 0.2 % (ref 0.3–1.12)
MONOCYTES # BLD AUTO: 0.7 X10'3 (ref 0–0.9)
MONOCYTES # BLD AUTO: 0.8 X10'3 (ref 0–0.9)
MONOCYTES # BLD AUTO: 1 X10'3 (ref 0–0.9)
MONOCYTES # BLD AUTO: 1.1 X10'3 (ref 0–0.9)
MONOCYTES NFR BLD AUTO: 5.5 % (ref 2–12)
MONOCYTES NFR BLD AUTO: 7.4 % (ref 2–12)
MONOCYTES NFR BLD AUTO: 8.1 % (ref 2–12)
MONOCYTES NFR BLD AUTO: 8.8 % (ref 2–12)
NEUTROPHILS # BLD AUTO: 10.8 X10'3 (ref 1.8–7.7)
NEUTROPHILS # BLD AUTO: 11.1 X10'3 (ref 1.8–7.7)
NEUTROPHILS # BLD AUTO: 9.3 X10'3 (ref 1.8–7.7)
NEUTROPHILS # BLD AUTO: 9.3 X10'3 (ref 1.8–7.7)
NEUTROPHILS NFR BLD AUTO: 84.1 % (ref 42–75)
NEUTROPHILS NFR BLD AUTO: 84.9 % (ref 42–75)
NEUTROPHILS NFR BLD AUTO: 85.1 % (ref 42–75)
NEUTROPHILS NFR BLD AUTO: 85.3 % (ref 42–75)
O2/TOTAL GAS SETTING VFR VENT: 60 MMHG/%
OXYHGB MFR BLDA: 96.4 % (ref 94–100)
PCO2 TEMP ADJ BLDA: 35.3 MMHG (ref 35–48)
PEEP RESPIRATORY: 5 CM H2O
PH TEMP ADJ BLDA: 7.33 [PH] (ref 7.35–7.45)
PHOSPHATE SERPL-MCNC: 2.8 MG/DL (ref 2.3–4.5)
PHOSPHATE SERPL-MCNC: 3 MG/DL (ref 2.3–4.5)
PHOSPHATE SERPL-MCNC: 3.1 MG/DL (ref 2.3–4.5)
PHOSPHATE SERPL-MCNC: 3.3 MG/DL (ref 2.3–4.5)
PLATELET # BLD AUTO: 154 X10'3 (ref 140–440)
PLATELET # BLD AUTO: 155 X10'3 (ref 140–440)
PLATELET # BLD AUTO: 169 X10'3 (ref 140–440)
PLATELET # BLD AUTO: 176 X10'3 (ref 140–440)
PMV BLD AUTO: 9.2 FL (ref 7.4–10.4)
PMV BLD AUTO: 9.5 FL (ref 7.4–10.4)
PMV BLD AUTO: 9.5 FL (ref 7.4–10.4)
PMV BLD AUTO: 9.9 FL (ref 7.4–10.4)
PO2 TEMP ADJ BLD: 91.8 MMHG (ref 83–108)
PO2 TEMP ADJ BLDMV: 45.2 MMHG (ref 35–46)
PO2 TEMP ADJ BLDMV: 46.4 MMHG (ref 35–46)
POTASSIUM SERPL-SCNC: 3.2 MMOL/L (ref 3.5–5.1)
POTASSIUM SERPL-SCNC: 3.6 MMOL/L (ref 3.5–5.1)
POTASSIUM SERPL-SCNC: 3.8 MMOL/L (ref 3.5–5.1)
POTASSIUM SERPL-SCNC: 3.8 MMOL/L (ref 3.5–5.1)
PREALB SERPL-MCNC: 18.2 MG/DL (ref 19–36)
PROT SERPL-MCNC: 6.1 G/DL (ref 6.4–8.2)
RBC # BLD AUTO: 3.88 X10'6 (ref 4.7–6.1)
RBC # BLD AUTO: 4.04 X10'6 (ref 4.7–6.1)
RBC # BLD AUTO: 4.11 X10'6 (ref 4.7–6.1)
RBC # BLD AUTO: 4.38 X10'6 (ref 4.7–6.1)
RESP RATE RESTING: 9 B/MIN
SAO2 % BLDA: 96.9 % (ref 95–98)
SAO2 % BLDMV: 74.8 % (ref 60–80)
SAO2 % BLDMV: 83.3 % (ref 60–80)
SODIUM SERPL-SCNC: 141 MMOL/L (ref 135–145)
SODIUM SERPL-SCNC: 141 MMOL/L (ref 135–145)
SODIUM SERPL-SCNC: 142 MMOL/L (ref 135–145)
SODIUM SERPL-SCNC: 142 MMOL/L (ref 135–145)
TROPONIN I SERPL-MCNC: 3.24 NG/ML (ref 0–0.05)
VOL.EXP/M/BW ON VENT: 9 L/MIN
WBC # BLD AUTO: 10.9 X10'3 (ref 4.5–11)
WBC # BLD AUTO: 11 X10'3 (ref 4.5–11)
WBC # BLD AUTO: 12.6 X10'3 (ref 4.5–11)
WBC # BLD AUTO: 13.1 X10'3 (ref 4.5–11)

## 2019-01-28 RX ADMIN — MINERAL OIL, PETROLATUM SCH INCH: 425; 568 OINTMENT OPHTHALMIC at 08:29

## 2019-01-28 RX ADMIN — IPRATROPIUM BROMIDE AND ALBUTEROL SULFATE SCH ML: .5; 3 SOLUTION RESPIRATORY (INHALATION) at 19:14

## 2019-01-28 RX ADMIN — MINERAL OIL, PETROLATUM SCH INCH: 425; 568 OINTMENT OPHTHALMIC at 02:00

## 2019-01-28 RX ADMIN — DOBUTAMINE IN DEXTROSE SCH MLS/HR: 200 INJECTION, SOLUTION INTRAVENOUS at 00:06

## 2019-01-28 RX ADMIN — DOCUSATE SODIUM SCH MG: 50 LIQUID ORAL at 19:57

## 2019-01-28 RX ADMIN — SODIUM CHLORIDE SCH MG: 9 INJECTION INTRAMUSCULAR; INTRAVENOUS; SUBCUTANEOUS at 19:56

## 2019-01-28 RX ADMIN — ENOXAPARIN SODIUM SCH MG: 100 INJECTION SUBCUTANEOUS at 18:03

## 2019-01-28 RX ADMIN — IPRATROPIUM BROMIDE AND ALBUTEROL SULFATE SCH ML: .5; 3 SOLUTION RESPIRATORY (INHALATION) at 14:47

## 2019-01-28 RX ADMIN — ACETAMINOPHEN PRN MG: 160 SUSPENSION ORAL at 19:57

## 2019-01-28 RX ADMIN — AMIODARONE HYDROCHLORIDE SCH MLS/HR: 1.8 INJECTION, SOLUTION INTRAVENOUS at 05:28

## 2019-01-28 RX ADMIN — IPRATROPIUM BROMIDE AND ALBUTEROL SULFATE SCH ML: .5; 3 SOLUTION RESPIRATORY (INHALATION) at 03:23

## 2019-01-28 RX ADMIN — IPRATROPIUM BROMIDE AND ALBUTEROL SULFATE SCH ML: .5; 3 SOLUTION RESPIRATORY (INHALATION) at 07:35

## 2019-01-28 RX ADMIN — SODIUM CHLORIDE SCH MG: 9 INJECTION INTRAMUSCULAR; INTRAVENOUS; SUBCUTANEOUS at 08:29

## 2019-01-28 RX ADMIN — MINERAL OIL, PETROLATUM SCH INCH: 425; 568 OINTMENT OPHTHALMIC at 19:56

## 2019-01-28 RX ADMIN — AMIODARONE HYDROCHLORIDE SCH MLS/HR: 1.8 INJECTION, SOLUTION INTRAVENOUS at 00:07

## 2019-01-28 RX ADMIN — IPRATROPIUM BROMIDE AND ALBUTEROL SULFATE SCH ML: .5; 3 SOLUTION RESPIRATORY (INHALATION) at 23:12

## 2019-01-28 RX ADMIN — SODIUM CHLORIDE SCH MLS/HR: 9 INJECTION INTRAMUSCULAR; INTRAVENOUS; SUBCUTANEOUS at 06:39

## 2019-01-28 RX ADMIN — Medication SCH MLS/HR: at 02:03

## 2019-01-28 RX ADMIN — HEPARIN SODIUM AND DEXTROSE SCH MLS/HR: 10000; 5 INJECTION INTRAVENOUS at 00:59

## 2019-01-28 RX ADMIN — IPRATROPIUM BROMIDE AND ALBUTEROL SULFATE SCH ML: .5; 3 SOLUTION RESPIRATORY (INHALATION) at 12:06

## 2019-01-28 RX ADMIN — MINERAL OIL, PETROLATUM SCH INCH: 425; 568 OINTMENT OPHTHALMIC at 14:29

## 2019-01-28 RX ADMIN — AMIODARONE HYDROCHLORIDE SCH MLS/HR: 1.8 INJECTION, SOLUTION INTRAVENOUS at 06:39

## 2019-01-28 RX ADMIN — DOCUSATE SODIUM SCH MG: 50 LIQUID ORAL at 09:39

## 2019-01-28 NOTE — NUR
pt temperature continues to increase now at 37.5. sent message to pharmacy to change APAP to 
liquid form in order to administer to patient to prevent rebound hyperthermia

## 2019-01-28 NOTE — NUR
Pt's on the ventilator is not alert/oriented to participate in psychosocial assessment.  SS 
met w/family briefly to introduce self and provide information re SS role.  SS will continue 
to monitor & provided support to family as needed.

## 2019-01-28 NOTE — NUR
TF Consult: Pt intubated to start TF today per MD. Will monitor for 

feeding tolerance; high protein recs for intubation needs below.



Initial: Pt intubated s/p code suspected to be down for 2 hours w/ 30min

CPR prior to admit per MD note. Currently unresponsive w/ hypothermia

protocol. Hx cardiac stents; T2DM last A1C 7.3 2014; will need new draw

once stable. James 9 w/ general +1 trace edema and skin intact. OG in

place w/ MAP 88. Colace held given NPO/rewarming. Will monitor for

nutrition support needs.

Rec:

1. OGTF using Vital HP at 95ml/hr goal; to provide 2280ml fluid, 

2280kcals, 1915ml free water, and 200g protein. Initiate at 20ml/hr and 

advance 20ml Q8 to goal as tolerated.

2. water flush 100ml Q6

3. prealbumin Q M/Th, daily wts

4. New A1C once stable prior to d/c; hx T2DM last A1C 2014

-------------------------------------------------------------------------------

Addendum: 01/28/19 at 1209 by Butch Gifford RD

-------------------------------------------------------------------------------

Amended: Links added.

## 2019-01-28 NOTE — NUR
RN notes temperature continue to rise. changed vent temperature to non-invasive and turned 
down temperature in room

## 2019-01-28 NOTE — NUR
Spoke to RT. pt breathing 6-9 times a minute; however, O2 Sats are 100% TV greater than 
1200. Changed off spontaneous to A/C VC 50% FiO2. . Rate of 14, PEEP of 5.

## 2019-01-28 NOTE — NUR
IDT rounding this am with Dr. Zuleta. Updated by SBAR report. Updated MD on Labs, 
decreased UOP, decreased pressor requirement, and mental status. Pt currently on 3mg/hr of 
versed +cough/gag, does not withdrawal from pain, pupils equal and reactive. UOP decreased, 
MD requests tube feeds be started and requests MIV be discontinued (which was stopped per 
order at 1000 am). Updated on increasing Troponin; MD states will consult cardiology. Orders 
to replace Mg received. RN will continue to monitor.

## 2019-01-29 VITALS — SYSTOLIC BLOOD PRESSURE: 127 MMHG | DIASTOLIC BLOOD PRESSURE: 76 MMHG

## 2019-01-29 VITALS — SYSTOLIC BLOOD PRESSURE: 125 MMHG | DIASTOLIC BLOOD PRESSURE: 77 MMHG

## 2019-01-29 VITALS — SYSTOLIC BLOOD PRESSURE: 119 MMHG | DIASTOLIC BLOOD PRESSURE: 61 MMHG

## 2019-01-29 VITALS — DIASTOLIC BLOOD PRESSURE: 60 MMHG | SYSTOLIC BLOOD PRESSURE: 111 MMHG

## 2019-01-29 VITALS — SYSTOLIC BLOOD PRESSURE: 110 MMHG | DIASTOLIC BLOOD PRESSURE: 63 MMHG

## 2019-01-29 VITALS — SYSTOLIC BLOOD PRESSURE: 133 MMHG | DIASTOLIC BLOOD PRESSURE: 71 MMHG

## 2019-01-29 VITALS — SYSTOLIC BLOOD PRESSURE: 130 MMHG | DIASTOLIC BLOOD PRESSURE: 63 MMHG

## 2019-01-29 VITALS — DIASTOLIC BLOOD PRESSURE: 52 MMHG | SYSTOLIC BLOOD PRESSURE: 95 MMHG

## 2019-01-29 VITALS — DIASTOLIC BLOOD PRESSURE: 85 MMHG | SYSTOLIC BLOOD PRESSURE: 132 MMHG

## 2019-01-29 VITALS — SYSTOLIC BLOOD PRESSURE: 113 MMHG | DIASTOLIC BLOOD PRESSURE: 63 MMHG

## 2019-01-29 VITALS — SYSTOLIC BLOOD PRESSURE: 110 MMHG | DIASTOLIC BLOOD PRESSURE: 53 MMHG

## 2019-01-29 VITALS — SYSTOLIC BLOOD PRESSURE: 123 MMHG | DIASTOLIC BLOOD PRESSURE: 63 MMHG

## 2019-01-29 VITALS — DIASTOLIC BLOOD PRESSURE: 57 MMHG | SYSTOLIC BLOOD PRESSURE: 95 MMHG

## 2019-01-29 VITALS — SYSTOLIC BLOOD PRESSURE: 130 MMHG | DIASTOLIC BLOOD PRESSURE: 65 MMHG

## 2019-01-29 VITALS — SYSTOLIC BLOOD PRESSURE: 115 MMHG | DIASTOLIC BLOOD PRESSURE: 59 MMHG

## 2019-01-29 VITALS — SYSTOLIC BLOOD PRESSURE: 125 MMHG | DIASTOLIC BLOOD PRESSURE: 56 MMHG

## 2019-01-29 VITALS — SYSTOLIC BLOOD PRESSURE: 122 MMHG | DIASTOLIC BLOOD PRESSURE: 62 MMHG

## 2019-01-29 VITALS — DIASTOLIC BLOOD PRESSURE: 57 MMHG | SYSTOLIC BLOOD PRESSURE: 103 MMHG

## 2019-01-29 VITALS — DIASTOLIC BLOOD PRESSURE: 63 MMHG | SYSTOLIC BLOOD PRESSURE: 109 MMHG

## 2019-01-29 VITALS — DIASTOLIC BLOOD PRESSURE: 70 MMHG | SYSTOLIC BLOOD PRESSURE: 118 MMHG

## 2019-01-29 VITALS — SYSTOLIC BLOOD PRESSURE: 109 MMHG | DIASTOLIC BLOOD PRESSURE: 59 MMHG

## 2019-01-29 VITALS — DIASTOLIC BLOOD PRESSURE: 56 MMHG | SYSTOLIC BLOOD PRESSURE: 107 MMHG

## 2019-01-29 VITALS — DIASTOLIC BLOOD PRESSURE: 65 MMHG | SYSTOLIC BLOOD PRESSURE: 119 MMHG

## 2019-01-29 VITALS — DIASTOLIC BLOOD PRESSURE: 61 MMHG | SYSTOLIC BLOOD PRESSURE: 107 MMHG

## 2019-01-29 LAB
ALBUMIN SERPL BCP-MCNC: 2.4 G/DL (ref 3.4–5)
ALBUMIN SERPL BCP-MCNC: 2.8 G/DL (ref 3.4–5)
ALBUMIN/GLOB SERPL: 0.8 {RATIO} (ref 1.1–1.5)
ALP SERPL-CCNC: 90 IU/L (ref 46–116)
ALT SERPL W P-5'-P-CCNC: 80 U/L (ref 12–78)
ANION GAP SERPL CALCULATED.3IONS-SCNC: 11 MMOL/L (ref 8–16)
ANION GAP SERPL CALCULATED.3IONS-SCNC: 9 MMOL/L (ref 8–16)
APTT PPP: 45 SECONDS (ref 22–32)
AST SERPL W P-5'-P-CCNC: 77 U/L (ref 10–37)
BASE EXCESS BLDA CALC-SCNC: -5.6 MMOL/L (ref -2–3)
BASOPHILS # BLD AUTO: 0 X10'3 (ref 0–0.2)
BASOPHILS # BLD AUTO: 0 X10'3 (ref 0–0.2)
BASOPHILS NFR BLD AUTO: 0.1 % (ref 0–1)
BASOPHILS NFR BLD AUTO: 0.1 % (ref 0–1)
BILIRUB SERPL-MCNC: 1 MG/DL (ref 0.1–1)
BODY TEMPERATURE: 37.8
BUN SERPL-MCNC: 25 MG/DL (ref 7–18)
BUN SERPL-MCNC: 28 MG/DL (ref 7–18)
BUN/CREAT SERPL: 13.9 (ref 5.4–32)
BUN/CREAT SERPL: 20.1 (ref 5.4–32)
CALCIUM SERPL-MCNC: 8.1 MG/DL (ref 8.5–10.1)
CALCIUM SERPL-MCNC: 8.2 MG/DL (ref 8.5–10.1)
CHLORIDE SERPL-SCNC: 107 MMOL/L (ref 99–107)
CHLORIDE SERPL-SCNC: 108 MMOL/L (ref 99–107)
CO2 SERPL-SCNC: 21.6 MMOL/L (ref 24–32)
CO2 SERPL-SCNC: 22.9 MMOL/L (ref 24–32)
COHGB MFR BLDA: 0.3 % (ref 0.5–1.5)
CREAT SERPL-MCNC: 1.39 MG/DL (ref 0.6–1.1)
CREAT SERPL-MCNC: 1.8 MG/DL (ref 0.6–1.1)
EOSINOPHIL # BLD AUTO: 0.1 X10'3 (ref 0–0.9)
EOSINOPHIL # BLD AUTO: 0.1 X10'3 (ref 0–0.9)
EOSINOPHIL NFR BLD AUTO: 0.7 % (ref 0–6)
EOSINOPHIL NFR BLD AUTO: 0.8 % (ref 0–6)
ERYTHROCYTE [DISTWIDTH] IN BLOOD BY AUTOMATED COUNT: 14 % (ref 11.5–14.5)
ERYTHROCYTE [DISTWIDTH] IN BLOOD BY AUTOMATED COUNT: 14.6 % (ref 11.5–14.5)
GFR SERPL CREATININE-BSD FRML MDRD: 37 ML/MIN
GFR SERPL CREATININE-BSD FRML MDRD: 50 ML/MIN
GLUCOSE SERPL-MCNC: 135 MG/DL (ref 70–104)
GLUCOSE SERPL-MCNC: 142 MG/DL (ref 70–104)
HCO3 BLDA-SCNC: 19.7 MMOL/L (ref 22–26)
HCT VFR BLD AUTO: 30 % (ref 42–52)
HCT VFR BLD AUTO: 34.8 % (ref 42–52)
HGB BLD-MCNC: 11.4 G/DL (ref 14–17.9)
HGB BLD-MCNC: 9.8 G/DL (ref 14–17.9)
HGB BLDA-MCNC: 12.2 G/DL (ref 14–18)
INR PPP: 1.2 INR
LYMPHOCYTES # BLD AUTO: 0.7 X10'3 (ref 1.1–4.8)
LYMPHOCYTES # BLD AUTO: 0.9 X10'3 (ref 1.1–4.8)
LYMPHOCYTES NFR BLD AUTO: 7.9 % (ref 21–51)
LYMPHOCYTES NFR BLD AUTO: 8 % (ref 21–51)
MAGNESIUM SERPL-MCNC: 2.1 MG/DL (ref 1.5–2.4)
MAGNESIUM SERPL-MCNC: 2.3 MG/DL (ref 1.5–2.4)
MCH RBC QN AUTO: 28.6 PG (ref 27–31)
MCH RBC QN AUTO: 28.9 PG (ref 27–31)
MCHC RBC AUTO-ENTMCNC: 32.7 % (ref 33–36.5)
MCHC RBC AUTO-ENTMCNC: 32.8 % (ref 33–36.5)
MCV RBC AUTO: 87.6 FL (ref 78–98)
MCV RBC AUTO: 88.2 FL (ref 78–98)
METHGB MFR BLDA: 0.1 % (ref 0.3–1.12)
MONOCYTES # BLD AUTO: 0.5 X10'3 (ref 0–0.9)
MONOCYTES # BLD AUTO: 0.9 X10'3 (ref 0–0.9)
MONOCYTES NFR BLD AUTO: 6.6 % (ref 2–12)
MONOCYTES NFR BLD AUTO: 8 % (ref 2–12)
NEUTROPHILS # BLD AUTO: 7 X10'3 (ref 1.8–7.7)
NEUTROPHILS # BLD AUTO: 9.5 X10'3 (ref 1.8–7.7)
NEUTROPHILS NFR BLD AUTO: 83.2 % (ref 42–75)
NEUTROPHILS NFR BLD AUTO: 84.6 % (ref 42–75)
O2/TOTAL GAS SETTING VFR VENT: 45 MMHG/%
OXYHGB MFR BLDA: 97 % (ref 94–100)
PCO2 TEMP ADJ BLDA: 39.1 MMHG (ref 35–48)
PEEP RESPIRATORY: 5 CM H2O
PH TEMP ADJ BLDA: 7.32 [PH] (ref 7.35–7.45)
PHOSPHATE SERPL-MCNC: 1.8 MG/DL (ref 2.3–4.5)
PHOSPHATE SERPL-MCNC: 2.6 MG/DL (ref 2.3–4.5)
PLATELET # BLD AUTO: 123 X10'3 (ref 140–440)
PLATELET # BLD AUTO: 183 X10'3 (ref 140–440)
PMV BLD AUTO: 10.1 FL (ref 7.4–10.4)
PMV BLD AUTO: 9.9 FL (ref 7.4–10.4)
PO2 TEMP ADJ BLD: 113.3 MMHG (ref 83–108)
POTASSIUM SERPL-SCNC: 3.8 MMOL/L (ref 3.5–5.1)
POTASSIUM SERPL-SCNC: 3.9 MMOL/L (ref 3.5–5.1)
PROT SERPL-MCNC: 6.1 G/DL (ref 6.4–8.2)
PROTHROMBIN TIME: 11.7 SECONDS (ref 9–12)
RBC # BLD AUTO: 3.4 X10'6 (ref 4.7–6.1)
RBC # BLD AUTO: 3.98 X10'6 (ref 4.7–6.1)
RESP RATE RESTING: 8 B/MIN
SAO2 % BLDA: 97.4 % (ref 95–98)
SODIUM SERPL-SCNC: 139 MMOL/L (ref 135–145)
SODIUM SERPL-SCNC: 141 MMOL/L (ref 135–145)
VOL.EXP/M/BW ON VENT: 8 L/MIN
WBC # BLD AUTO: 11.4 X10'3 (ref 4.5–11)
WBC # BLD AUTO: 8.3 X10'3 (ref 4.5–11)

## 2019-01-29 RX ADMIN — ACETAMINOPHEN PRN MG: 160 SUSPENSION ORAL at 15:00

## 2019-01-29 RX ADMIN — MINERAL OIL, PETROLATUM SCH INCH: 425; 568 OINTMENT OPHTHALMIC at 20:15

## 2019-01-29 RX ADMIN — TAZOBACTAM SODIUM AND PIPERACILLIN SODIUM SCH MLS/HR: 250; 2 INJECTION, SOLUTION INTRAVENOUS at 14:00

## 2019-01-29 RX ADMIN — MINERAL OIL, PETROLATUM SCH INCH: 425; 568 OINTMENT OPHTHALMIC at 02:58

## 2019-01-29 RX ADMIN — DOBUTAMINE IN DEXTROSE SCH MLS/HR: 200 INJECTION, SOLUTION INTRAVENOUS at 01:29

## 2019-01-29 RX ADMIN — DOCUSATE SODIUM SCH MG: 50 LIQUID ORAL at 20:15

## 2019-01-29 RX ADMIN — ENOXAPARIN SODIUM SCH MG: 100 INJECTION SUBCUTANEOUS at 18:07

## 2019-01-29 RX ADMIN — IPRATROPIUM BROMIDE AND ALBUTEROL SULFATE SCH ML: .5; 3 SOLUTION RESPIRATORY (INHALATION) at 11:27

## 2019-01-29 RX ADMIN — IPRATROPIUM BROMIDE AND ALBUTEROL SULFATE SCH ML: .5; 3 SOLUTION RESPIRATORY (INHALATION) at 23:09

## 2019-01-29 RX ADMIN — MINERAL OIL, PETROLATUM SCH INCH: 425; 568 OINTMENT OPHTHALMIC at 14:00

## 2019-01-29 RX ADMIN — MINERAL OIL, PETROLATUM SCH INCH: 425; 568 OINTMENT OPHTHALMIC at 08:59

## 2019-01-29 RX ADMIN — IPRATROPIUM BROMIDE AND ALBUTEROL SULFATE SCH ML: .5; 3 SOLUTION RESPIRATORY (INHALATION) at 02:16

## 2019-01-29 RX ADMIN — ACETAMINOPHEN PRN MG: 160 SUSPENSION ORAL at 08:58

## 2019-01-29 RX ADMIN — Medication SCH MG: at 21:11

## 2019-01-29 RX ADMIN — IPRATROPIUM BROMIDE AND ALBUTEROL SULFATE SCH ML: .5; 3 SOLUTION RESPIRATORY (INHALATION) at 16:32

## 2019-01-29 RX ADMIN — SODIUM CHLORIDE SCH MG: 9 INJECTION INTRAMUSCULAR; INTRAVENOUS; SUBCUTANEOUS at 08:58

## 2019-01-29 RX ADMIN — DOCUSATE SODIUM SCH MG: 50 LIQUID ORAL at 08:59

## 2019-01-29 RX ADMIN — IPRATROPIUM BROMIDE AND ALBUTEROL SULFATE SCH ML: .5; 3 SOLUTION RESPIRATORY (INHALATION) at 19:10

## 2019-01-29 RX ADMIN — TAZOBACTAM SODIUM AND PIPERACILLIN SODIUM SCH MLS/HR: 250; 2 INJECTION, SOLUTION INTRAVENOUS at 20:15

## 2019-01-29 RX ADMIN — IPRATROPIUM BROMIDE AND ALBUTEROL SULFATE SCH ML: .5; 3 SOLUTION RESPIRATORY (INHALATION) at 07:00

## 2019-01-29 RX ADMIN — Medication PRN MLS/HR: at 02:58

## 2019-01-29 RX ADMIN — Medication SCH MLS/HR: at 00:14

## 2019-01-29 NOTE — NUR
Patient in room CICU 2015. I have received report from Nikole HOLLY RN and had the opportunity to 
ask questions and assume patient care. @0630 versed turned off, fentanyl turned off. Levo 
was running at 10 mcg/kg/min. Decreased levo to 5mcg/kg/min. @0700 decreased levo again to 
2.5mcg/kg/min. Pt may is 79. BIS monitor reading 49. Pt does have positive cough and gag. 
Does not withdrawal from pain, Pupils are equal and reactive. Daughter Julieth at bedside 
updated on pt status. VSS, RN will continue to monitor.

## 2019-01-29 NOTE — NUR
Patient in room CICU 2015. I have received report from Jonathon VILLATORO   and had the opportunity to 
ask questions and assume patient care.

## 2019-01-29 NOTE — NUR
Problems reprioritized. Patient report given, questions answered & plan of care reviewed 
with Jason Rn.

## 2019-01-29 NOTE — NUR
Patient in room CICU 2015. I have received report from Jason VILLATORO   and had the opportunity to 
ask questions and assume patient care.

/60 hr 85 sating 100% on vent with setting of cpap, st in place to gravity , no 
drips running, R IJ , patient resting comfortably, vital signs stable will continue to 
monitor

## 2019-01-30 VITALS — DIASTOLIC BLOOD PRESSURE: 62 MMHG | SYSTOLIC BLOOD PRESSURE: 131 MMHG

## 2019-01-30 VITALS — SYSTOLIC BLOOD PRESSURE: 107 MMHG | DIASTOLIC BLOOD PRESSURE: 57 MMHG

## 2019-01-30 VITALS — SYSTOLIC BLOOD PRESSURE: 131 MMHG | DIASTOLIC BLOOD PRESSURE: 62 MMHG

## 2019-01-30 VITALS — DIASTOLIC BLOOD PRESSURE: 59 MMHG | SYSTOLIC BLOOD PRESSURE: 120 MMHG

## 2019-01-30 VITALS — DIASTOLIC BLOOD PRESSURE: 55 MMHG | SYSTOLIC BLOOD PRESSURE: 113 MMHG

## 2019-01-30 VITALS — SYSTOLIC BLOOD PRESSURE: 116 MMHG | DIASTOLIC BLOOD PRESSURE: 61 MMHG

## 2019-01-30 VITALS — DIASTOLIC BLOOD PRESSURE: 66 MMHG | SYSTOLIC BLOOD PRESSURE: 111 MMHG

## 2019-01-30 VITALS — DIASTOLIC BLOOD PRESSURE: 59 MMHG | SYSTOLIC BLOOD PRESSURE: 111 MMHG

## 2019-01-30 VITALS — DIASTOLIC BLOOD PRESSURE: 56 MMHG | SYSTOLIC BLOOD PRESSURE: 113 MMHG

## 2019-01-30 VITALS — SYSTOLIC BLOOD PRESSURE: 123 MMHG | DIASTOLIC BLOOD PRESSURE: 55 MMHG

## 2019-01-30 VITALS — SYSTOLIC BLOOD PRESSURE: 119 MMHG | DIASTOLIC BLOOD PRESSURE: 63 MMHG

## 2019-01-30 VITALS — DIASTOLIC BLOOD PRESSURE: 58 MMHG | SYSTOLIC BLOOD PRESSURE: 104 MMHG

## 2019-01-30 VITALS — SYSTOLIC BLOOD PRESSURE: 111 MMHG | DIASTOLIC BLOOD PRESSURE: 53 MMHG

## 2019-01-30 VITALS — DIASTOLIC BLOOD PRESSURE: 55 MMHG | SYSTOLIC BLOOD PRESSURE: 122 MMHG

## 2019-01-30 VITALS — DIASTOLIC BLOOD PRESSURE: 61 MMHG | SYSTOLIC BLOOD PRESSURE: 114 MMHG

## 2019-01-30 VITALS — SYSTOLIC BLOOD PRESSURE: 124 MMHG | DIASTOLIC BLOOD PRESSURE: 53 MMHG

## 2019-01-30 VITALS — DIASTOLIC BLOOD PRESSURE: 50 MMHG | SYSTOLIC BLOOD PRESSURE: 103 MMHG

## 2019-01-30 VITALS — SYSTOLIC BLOOD PRESSURE: 120 MMHG | DIASTOLIC BLOOD PRESSURE: 55 MMHG

## 2019-01-30 VITALS — SYSTOLIC BLOOD PRESSURE: 105 MMHG | DIASTOLIC BLOOD PRESSURE: 58 MMHG

## 2019-01-30 VITALS — SYSTOLIC BLOOD PRESSURE: 104 MMHG | DIASTOLIC BLOOD PRESSURE: 64 MMHG

## 2019-01-30 VITALS — DIASTOLIC BLOOD PRESSURE: 51 MMHG | SYSTOLIC BLOOD PRESSURE: 111 MMHG

## 2019-01-30 VITALS — DIASTOLIC BLOOD PRESSURE: 53 MMHG | SYSTOLIC BLOOD PRESSURE: 111 MMHG

## 2019-01-30 LAB
ALBUMIN SERPL BCP-MCNC: 2.4 G/DL (ref 3.4–5)
ALBUMIN/GLOB SERPL: 0.7 {RATIO} (ref 1.1–1.5)
ALP SERPL-CCNC: 78 IU/L (ref 46–116)
ALT SERPL W P-5'-P-CCNC: 54 U/L (ref 12–78)
ANION GAP SERPL CALCULATED.3IONS-SCNC: 8 MMOL/L (ref 8–16)
APTT PPP: 48 SECONDS (ref 22–32)
AST SERPL W P-5'-P-CCNC: 57 U/L (ref 10–37)
BASE EXCESS BLDA CALC-SCNC: -5.9 MMOL/L (ref -2–3)
BASOPHILS # BLD AUTO: 0 X10'3 (ref 0–0.2)
BASOPHILS NFR BLD AUTO: 0.1 % (ref 0–1)
BILIRUB SERPL-MCNC: 1.1 MG/DL (ref 0.1–1)
BODY TEMPERATURE: 37.8
BUN SERPL-MCNC: 27 MG/DL (ref 7–18)
BUN/CREAT SERPL: 20.3 (ref 5.4–32)
CALCIUM SERPL-MCNC: 8.1 MG/DL (ref 8.5–10.1)
CHLORIDE SERPL-SCNC: 108 MMOL/L (ref 99–107)
CO2 SERPL-SCNC: 22.2 MMOL/L (ref 24–32)
COHGB MFR BLDA: 0 % (ref 0.5–1.5)
CREAT SERPL-MCNC: 1.33 MG/DL (ref 0.6–1.1)
EOSINOPHIL # BLD AUTO: 0 X10'3 (ref 0–0.9)
EOSINOPHIL NFR BLD AUTO: 0.1 % (ref 0–6)
ERYTHROCYTE [DISTWIDTH] IN BLOOD BY AUTOMATED COUNT: 14.3 % (ref 11.5–14.5)
GFR SERPL CREATININE-BSD FRML MDRD: 53 ML/MIN
GLUCOSE SERPL-MCNC: 146 MG/DL (ref 70–104)
HCO3 BLDA-SCNC: 18.8 MMOL/L (ref 22–26)
HCT VFR BLD AUTO: 29.3 % (ref 42–52)
HGB BLD-MCNC: 9.8 G/DL (ref 14–17.9)
HGB BLDA-MCNC: 10.5 G/DL (ref 14–18)
INR PPP: 1.1 INR
LYMPHOCYTES # BLD AUTO: 0.7 X10'3 (ref 1.1–4.8)
LYMPHOCYTES NFR BLD AUTO: 8.2 % (ref 21–51)
MAGNESIUM SERPL-MCNC: 2.1 MG/DL (ref 1.5–2.4)
MCH RBC QN AUTO: 29.3 PG (ref 27–31)
MCHC RBC AUTO-ENTMCNC: 33.3 % (ref 33–36.5)
MCV RBC AUTO: 88.1 FL (ref 78–98)
METHGB MFR BLDA: 0.1 % (ref 0.3–1.12)
MONOCYTES # BLD AUTO: 0.6 X10'3 (ref 0–0.9)
MONOCYTES NFR BLD AUTO: 7.3 % (ref 2–12)
NEUTROPHILS # BLD AUTO: 7.2 X10'3 (ref 1.8–7.7)
NEUTROPHILS NFR BLD AUTO: 84.3 % (ref 42–75)
O2/TOTAL GAS SETTING VFR VENT: 40 MMHG/%
OXYHGB MFR BLDA: 92.2 % (ref 94–100)
PCO2 TEMP ADJ BLDA: 35.3 MMHG (ref 35–48)
PEEP RESPIRATORY: 5 CM H2O
PH TEMP ADJ BLDA: 7.35 [PH] (ref 7.35–7.45)
PHOSPHATE SERPL-MCNC: 1.8 MG/DL (ref 2.3–4.5)
PLATELET # BLD AUTO: 126 X10'3 (ref 140–440)
PMV BLD AUTO: 9.2 FL (ref 7.4–10.4)
PO2 TEMP ADJ BLD: 70.5 MMHG (ref 83–108)
POTASSIUM SERPL-SCNC: 4.1 MMOL/L (ref 3.5–5.1)
PROT SERPL-MCNC: 5.9 G/DL (ref 6.4–8.2)
PROTHROMBIN TIME: 10.9 SECONDS (ref 9–12)
RBC # BLD AUTO: 3.33 X10'6 (ref 4.7–6.1)
RESP RATE RESTING: 15 B/MIN
SAO2 % BLDA: 92.3 % (ref 95–98)
SODIUM SERPL-SCNC: 138 MMOL/L (ref 135–145)
VOL.EXP/M/BW ON VENT: 8 L/MIN
WBC # BLD AUTO: 8.6 X10'3 (ref 4.5–11)

## 2019-01-30 PROCEDURE — 4A10X4Z MONITORING OF CENTRAL NERVOUS ELECTRICAL ACTIVITY, EXTERNAL APPROACH: ICD-10-PCS

## 2019-01-30 RX ADMIN — LEVETIRACETAM SCH MLS/HR: 100 INJECTION, SOLUTION, CONCENTRATE INTRAVENOUS at 09:48

## 2019-01-30 RX ADMIN — TAZOBACTAM SODIUM AND PIPERACILLIN SODIUM SCH MLS/HR: 250; 2 INJECTION, SOLUTION INTRAVENOUS at 03:03

## 2019-01-30 RX ADMIN — IPRATROPIUM BROMIDE AND ALBUTEROL SULFATE SCH ML: .5; 3 SOLUTION RESPIRATORY (INHALATION) at 12:20

## 2019-01-30 RX ADMIN — IPRATROPIUM BROMIDE AND ALBUTEROL SULFATE SCH ML: .5; 3 SOLUTION RESPIRATORY (INHALATION) at 23:22

## 2019-01-30 RX ADMIN — MINERAL OIL, PETROLATUM SCH INCH: 425; 568 OINTMENT OPHTHALMIC at 03:04

## 2019-01-30 RX ADMIN — IPRATROPIUM BROMIDE AND ALBUTEROL SULFATE SCH ML: .5; 3 SOLUTION RESPIRATORY (INHALATION) at 03:41

## 2019-01-30 RX ADMIN — DOCUSATE SODIUM SCH MG: 50 LIQUID ORAL at 20:26

## 2019-01-30 RX ADMIN — ACETAMINOPHEN PRN MG: 160 SUSPENSION ORAL at 07:49

## 2019-01-30 RX ADMIN — IPRATROPIUM BROMIDE AND ALBUTEROL SULFATE SCH ML: .5; 3 SOLUTION RESPIRATORY (INHALATION) at 16:19

## 2019-01-30 RX ADMIN — MINERAL OIL, PETROLATUM SCH INCH: 425; 568 OINTMENT OPHTHALMIC at 14:40

## 2019-01-30 RX ADMIN — MINERAL OIL, PETROLATUM SCH INCH: 425; 568 OINTMENT OPHTHALMIC at 07:49

## 2019-01-30 RX ADMIN — TAZOBACTAM SODIUM AND PIPERACILLIN SODIUM SCH MLS/HR: 250; 2 INJECTION, SOLUTION INTRAVENOUS at 20:26

## 2019-01-30 RX ADMIN — LEVETIRACETAM SCH MLS/HR: 100 INJECTION, SOLUTION, CONCENTRATE INTRAVENOUS at 20:26

## 2019-01-30 RX ADMIN — DOCUSATE SODIUM SCH MG: 50 LIQUID ORAL at 07:49

## 2019-01-30 RX ADMIN — ENOXAPARIN SODIUM SCH MG: 100 INJECTION SUBCUTANEOUS at 17:58

## 2019-01-30 RX ADMIN — TAZOBACTAM SODIUM AND PIPERACILLIN SODIUM SCH MLS/HR: 250; 2 INJECTION, SOLUTION INTRAVENOUS at 07:48

## 2019-01-30 RX ADMIN — IPRATROPIUM BROMIDE AND ALBUTEROL SULFATE SCH ML: .5; 3 SOLUTION RESPIRATORY (INHALATION) at 19:36

## 2019-01-30 RX ADMIN — TAZOBACTAM SODIUM AND PIPERACILLIN SODIUM SCH MLS/HR: 250; 2 INJECTION, SOLUTION INTRAVENOUS at 14:40

## 2019-01-30 RX ADMIN — Medication SCH MG: at 20:25

## 2019-01-30 RX ADMIN — IPRATROPIUM BROMIDE AND ALBUTEROL SULFATE SCH ML: .5; 3 SOLUTION RESPIRATORY (INHALATION) at 08:36

## 2019-01-30 RX ADMIN — MINERAL OIL, PETROLATUM SCH INCH: 425; 568 OINTMENT OPHTHALMIC at 20:26

## 2019-01-30 NOTE — NUR
EEG tech called with findings from yesterdays EEG, stated " single seizure on test: , " 
frequent discharges from R hemisphere central and temporal" informed FNP Darya, stated 
that she will wait till she sees the written report before doing anything else.

## 2019-01-30 NOTE — NUR
Patient in room CICU 2015. I have received report from  Jason Romero  and had the opportunity to 
ask questions and assume patient care. Bp 120/59, hr 79 sating 98% on CPAP/PS,  cvp 4, etco2 
29, temp 38.2, st in place draining to gravity, restraints in place, R IJ clean dry and 
in tack, ns infusing at 10ml/hr (tko), patient resting comfortably, vital signs stable will 
continue to monitor

## 2019-01-31 VITALS — SYSTOLIC BLOOD PRESSURE: 119 MMHG | DIASTOLIC BLOOD PRESSURE: 58 MMHG

## 2019-01-31 VITALS — SYSTOLIC BLOOD PRESSURE: 104 MMHG | DIASTOLIC BLOOD PRESSURE: 62 MMHG

## 2019-01-31 VITALS — DIASTOLIC BLOOD PRESSURE: 64 MMHG | SYSTOLIC BLOOD PRESSURE: 118 MMHG

## 2019-01-31 VITALS — DIASTOLIC BLOOD PRESSURE: 62 MMHG | SYSTOLIC BLOOD PRESSURE: 111 MMHG

## 2019-01-31 VITALS — SYSTOLIC BLOOD PRESSURE: 121 MMHG | DIASTOLIC BLOOD PRESSURE: 64 MMHG

## 2019-01-31 VITALS — SYSTOLIC BLOOD PRESSURE: 107 MMHG | DIASTOLIC BLOOD PRESSURE: 88 MMHG

## 2019-01-31 VITALS — DIASTOLIC BLOOD PRESSURE: 62 MMHG | SYSTOLIC BLOOD PRESSURE: 104 MMHG

## 2019-01-31 VITALS — SYSTOLIC BLOOD PRESSURE: 112 MMHG | DIASTOLIC BLOOD PRESSURE: 60 MMHG

## 2019-01-31 VITALS — SYSTOLIC BLOOD PRESSURE: 132 MMHG | DIASTOLIC BLOOD PRESSURE: 71 MMHG

## 2019-01-31 VITALS — SYSTOLIC BLOOD PRESSURE: 118 MMHG | DIASTOLIC BLOOD PRESSURE: 58 MMHG

## 2019-01-31 VITALS — SYSTOLIC BLOOD PRESSURE: 137 MMHG | DIASTOLIC BLOOD PRESSURE: 61 MMHG

## 2019-01-31 VITALS — DIASTOLIC BLOOD PRESSURE: 63 MMHG | SYSTOLIC BLOOD PRESSURE: 126 MMHG

## 2019-01-31 VITALS — SYSTOLIC BLOOD PRESSURE: 105 MMHG | DIASTOLIC BLOOD PRESSURE: 59 MMHG

## 2019-01-31 VITALS — DIASTOLIC BLOOD PRESSURE: 65 MMHG | SYSTOLIC BLOOD PRESSURE: 132 MMHG

## 2019-01-31 VITALS — SYSTOLIC BLOOD PRESSURE: 114 MMHG | DIASTOLIC BLOOD PRESSURE: 57 MMHG

## 2019-01-31 VITALS — SYSTOLIC BLOOD PRESSURE: 127 MMHG | DIASTOLIC BLOOD PRESSURE: 60 MMHG

## 2019-01-31 VITALS — DIASTOLIC BLOOD PRESSURE: 59 MMHG | SYSTOLIC BLOOD PRESSURE: 123 MMHG

## 2019-01-31 VITALS — SYSTOLIC BLOOD PRESSURE: 111 MMHG | DIASTOLIC BLOOD PRESSURE: 62 MMHG

## 2019-01-31 VITALS — SYSTOLIC BLOOD PRESSURE: 120 MMHG | DIASTOLIC BLOOD PRESSURE: 66 MMHG

## 2019-01-31 VITALS — DIASTOLIC BLOOD PRESSURE: 66 MMHG | SYSTOLIC BLOOD PRESSURE: 120 MMHG

## 2019-01-31 VITALS — SYSTOLIC BLOOD PRESSURE: 118 MMHG | DIASTOLIC BLOOD PRESSURE: 63 MMHG

## 2019-01-31 VITALS — DIASTOLIC BLOOD PRESSURE: 63 MMHG | SYSTOLIC BLOOD PRESSURE: 132 MMHG

## 2019-01-31 VITALS — SYSTOLIC BLOOD PRESSURE: 118 MMHG | DIASTOLIC BLOOD PRESSURE: 61 MMHG

## 2019-01-31 VITALS — DIASTOLIC BLOOD PRESSURE: 65 MMHG | SYSTOLIC BLOOD PRESSURE: 131 MMHG

## 2019-01-31 LAB
ALBUMIN SERPL BCP-MCNC: 2.3 G/DL (ref 3.4–5)
ALBUMIN/GLOB SERPL: 0.6 {RATIO} (ref 1.1–1.5)
ALP SERPL-CCNC: 75 IU/L (ref 46–116)
ALT SERPL W P-5'-P-CCNC: 42 U/L (ref 12–78)
ANION GAP SERPL CALCULATED.3IONS-SCNC: 10 MMOL/L (ref 8–16)
APTT PPP: 41 SECONDS (ref 22–32)
AST SERPL W P-5'-P-CCNC: 38 U/L (ref 10–37)
BASE EXCESS BLDA CALC-SCNC: -0.9 MMOL/L (ref -2–3)
BASOPHILS # BLD AUTO: 0 X10'3 (ref 0–0.2)
BASOPHILS NFR BLD AUTO: 0.3 % (ref 0–1)
BILIRUB SERPL-MCNC: 0.8 MG/DL (ref 0.1–1)
BODY TEMPERATURE: 37.5
BUN SERPL-MCNC: 32 MG/DL (ref 7–18)
BUN/CREAT SERPL: 24.4 (ref 5.4–32)
CALCIUM SERPL-MCNC: 8 MG/DL (ref 8.5–10.1)
CHLORIDE SERPL-SCNC: 105 MMOL/L (ref 99–107)
CO2 SERPL-SCNC: 25.1 MMOL/L (ref 24–32)
COHGB MFR BLDA: 0.3 % (ref 0.5–1.5)
CREAT SERPL-MCNC: 1.31 MG/DL (ref 0.6–1.1)
EOSINOPHIL # BLD AUTO: 0.1 X10'3 (ref 0–0.9)
EOSINOPHIL NFR BLD AUTO: 1.3 % (ref 0–6)
ERYTHROCYTE [DISTWIDTH] IN BLOOD BY AUTOMATED COUNT: 14.2 % (ref 11.5–14.5)
GFR SERPL CREATININE-BSD FRML MDRD: 53 ML/MIN
GLUCOSE SERPL-MCNC: 170 MG/DL (ref 70–104)
HBA1C MFR BLD: 7 % (ref 4.5–6.2)
HCO3 BLDA-SCNC: 22.8 MMOL/L (ref 22–26)
HCT VFR BLD AUTO: 28.8 % (ref 42–52)
HGB BLD-MCNC: 9.5 G/DL (ref 14–17.9)
HGB BLDA-MCNC: 10.6 G/DL (ref 14–18)
INR PPP: 1 INR
LYMPHOCYTES # BLD AUTO: 0.7 X10'3 (ref 1.1–4.8)
LYMPHOCYTES NFR BLD AUTO: 7.6 % (ref 21–51)
MAGNESIUM SERPL-MCNC: 1.6 MG/DL (ref 1.5–2.4)
MCH RBC QN AUTO: 29.1 PG (ref 27–31)
MCHC RBC AUTO-ENTMCNC: 33.1 G/DL (ref 33–36.5)
MCV RBC AUTO: 88.1 FL (ref 78–98)
METHGB MFR BLDA: 0 % (ref 0.3–1.12)
MONOCYTES # BLD AUTO: 0.6 X10'3 (ref 0–0.9)
MONOCYTES NFR BLD AUTO: 6.7 % (ref 2–12)
NEUTROPHILS # BLD AUTO: 7.8 X10'3 (ref 1.8–7.7)
NEUTROPHILS NFR BLD AUTO: 84.1 % (ref 42–75)
O2/TOTAL GAS SETTING VFR VENT: 40 MMHG/%
OXYHGB MFR BLDA: 97.6 % (ref 94–100)
PCO2 TEMP ADJ BLDA: 35 MMHG (ref 35–48)
PEEP RESPIRATORY: 5 CM H2O
PH TEMP ADJ BLDA: 7.43 [PH] (ref 7.35–7.45)
PHOSPHATE SERPL-MCNC: 3.5 MG/DL (ref 2.3–4.5)
PLATELET # BLD AUTO: 141 X10'3 (ref 140–440)
PMV BLD AUTO: 9.4 FL (ref 7.4–10.4)
PO2 TEMP ADJ BLD: 113.4 MMHG (ref 83–108)
POTASSIUM SERPL-SCNC: 3.8 MMOL/L (ref 3.5–5.1)
PREALB SERPL-MCNC: 13.6 MG/DL (ref 19–36)
PROT SERPL-MCNC: 6 G/DL (ref 6.4–8.2)
PROTHROMBIN TIME: 10.6 SECONDS (ref 9–12)
RBC # BLD AUTO: 3.27 X10'6 (ref 4.7–6.1)
RESP RATE RESTING: 21 B/MIN
SAO2 % BLDA: 97.9 % (ref 95–98)
SODIUM SERPL-SCNC: 140 MMOL/L (ref 135–145)
VOL.EXP/M/BW ON VENT: 9 L/MIN
WBC # BLD AUTO: 9.3 X10'3 (ref 4.5–11)

## 2019-01-31 PROCEDURE — 4A10X4Z MONITORING OF CENTRAL NERVOUS ELECTRICAL ACTIVITY, EXTERNAL APPROACH: ICD-10-PCS

## 2019-01-31 RX ADMIN — MORPHINE SULFATE PRN MG: 4 INJECTION, SOLUTION INTRAMUSCULAR; INTRAVENOUS at 18:58

## 2019-01-31 RX ADMIN — IPRATROPIUM BROMIDE AND ALBUTEROL SULFATE SCH ML: .5; 3 SOLUTION RESPIRATORY (INHALATION) at 11:29

## 2019-01-31 RX ADMIN — TAZOBACTAM SODIUM AND PIPERACILLIN SODIUM SCH MLS/HR: 250; 2 INJECTION, SOLUTION INTRAVENOUS at 03:23

## 2019-01-31 RX ADMIN — TAZOBACTAM SODIUM AND PIPERACILLIN SODIUM SCH MLS/HR: 250; 2 INJECTION, SOLUTION INTRAVENOUS at 07:38

## 2019-01-31 RX ADMIN — IPRATROPIUM BROMIDE AND ALBUTEROL SULFATE SCH ML: .5; 3 SOLUTION RESPIRATORY (INHALATION) at 03:27

## 2019-01-31 RX ADMIN — MINERAL OIL, PETROLATUM SCH INCH: 425; 568 OINTMENT OPHTHALMIC at 03:24

## 2019-01-31 RX ADMIN — LEVETIRACETAM SCH MLS/HR: 100 INJECTION, SOLUTION, CONCENTRATE INTRAVENOUS at 07:37

## 2019-01-31 RX ADMIN — MINERAL OIL, PETROLATUM SCH INCH: 425; 568 OINTMENT OPHTHALMIC at 07:39

## 2019-01-31 RX ADMIN — IPRATROPIUM BROMIDE AND ALBUTEROL SULFATE SCH ML: .5; 3 SOLUTION RESPIRATORY (INHALATION) at 07:58

## 2019-01-31 RX ADMIN — DOCUSATE SODIUM SCH MG: 50 LIQUID ORAL at 07:37

## 2019-01-31 RX ADMIN — MORPHINE SULFATE PRN MG: 4 INJECTION, SOLUTION INTRAMUSCULAR; INTRAVENOUS at 14:35

## 2019-01-31 RX ADMIN — MINERAL OIL, PETROLATUM SCH INCH: 425; 568 OINTMENT OPHTHALMIC at 14:00

## 2019-01-31 NOTE — NUR
Patient in room CICU 2015. I have received report from  Nikole VILLATORO  and had the opportunity to 
ask questions and assume patient care.

## 2019-01-31 NOTE — NUR
patients left foot is cooler than his right, pulse per doppler, which is different from 
initial assessment, notified NP Darya Lackey stated to continue to monitor

## 2019-01-31 NOTE — NUR
Family verbalized to ccmd that they wanted the pt to become comfort measures. [facility] 
called, awaiting call back.

## 2019-01-31 NOTE — NUR
Patient in room CICU 2015. I have received report from  IRVING Orozco  and had the opportunity 
to ask questions and assume patient care.

## 2019-01-31 NOTE — NUR
Pt continues to be on the ventillator, SS met w/spouse & son to discuss options for care & 
resources to access LTC in the event that pt is able to leave the hospital.  SS provided 
education re [employer], Assisted Living, private pay personal caregivers. SS also encouraged 
family members to discuss pt's progress & prognosis w/physician prior to making any 
decisions. 



SS will monitor to support dcp activities.

## 2019-01-31 NOTE — NUR
DM Consult: A1C 7.0. Pt remains intubated tolerated TF at 70 advancing to goal rate. MRI 
today and repeat EEG tomorrow per MD to determine extent of anoxic event s/p code. No BM yet 
on routine colace. Will continue to monitor for additional bowel care needs and feeding 
tolerance.

Rec:

1. OGTF using Vital HP at 95ml/hr goal; to provide 2280ml fluid,

2280kcals, 1915ml free water, and 200g protein. Initiate at 20ml/hr and

advance 20ml Q8 to goal as tolerated.

2. water flush 100ml Q6

3. prealbumin Q M/Th, daily wts

4. DM ed once stable and AOx4 s/p extubation

-------------------------------------------------------------------------------

Addendum: 01/31/19 at 1214 by Butch Gifford RD

-------------------------------------------------------------------------------

Amended: Links added.

## 2019-01-31 NOTE — NUR
Problems reprioritized. Patient report given, questions answered & plan of care reviewed 
with Marisel Romero.

## 2019-02-01 VITALS — SYSTOLIC BLOOD PRESSURE: 127 MMHG | DIASTOLIC BLOOD PRESSURE: 67 MMHG

## 2019-02-01 VITALS — SYSTOLIC BLOOD PRESSURE: 119 MMHG | DIASTOLIC BLOOD PRESSURE: 66 MMHG

## 2019-02-01 VITALS — SYSTOLIC BLOOD PRESSURE: 146 MMHG | DIASTOLIC BLOOD PRESSURE: 74 MMHG

## 2019-02-01 VITALS — DIASTOLIC BLOOD PRESSURE: 68 MMHG | SYSTOLIC BLOOD PRESSURE: 128 MMHG

## 2019-02-01 VITALS — DIASTOLIC BLOOD PRESSURE: 68 MMHG | SYSTOLIC BLOOD PRESSURE: 135 MMHG

## 2019-02-01 VITALS — SYSTOLIC BLOOD PRESSURE: 141 MMHG | DIASTOLIC BLOOD PRESSURE: 69 MMHG

## 2019-02-01 VITALS — DIASTOLIC BLOOD PRESSURE: 69 MMHG | SYSTOLIC BLOOD PRESSURE: 139 MMHG

## 2019-02-01 VITALS — DIASTOLIC BLOOD PRESSURE: 57 MMHG | SYSTOLIC BLOOD PRESSURE: 121 MMHG

## 2019-02-01 VITALS — SYSTOLIC BLOOD PRESSURE: 119 MMHG | DIASTOLIC BLOOD PRESSURE: 58 MMHG

## 2019-02-01 VITALS — SYSTOLIC BLOOD PRESSURE: 135 MMHG | DIASTOLIC BLOOD PRESSURE: 69 MMHG

## 2019-02-01 RX ADMIN — HYDROMORPHONE HYDROCHLORIDE SCH MLS/HR: 10 INJECTION, SOLUTION INTRAMUSCULAR; INTRAVENOUS; SUBCUTANEOUS at 21:00

## 2019-02-01 RX ADMIN — HYDROMORPHONE HYDROCHLORIDE SCH MLS/HR: 10 INJECTION, SOLUTION INTRAMUSCULAR; INTRAVENOUS; SUBCUTANEOUS at 23:00

## 2019-02-01 RX ADMIN — MORPHINE SULFATE PRN MG: 4 INJECTION, SOLUTION INTRAMUSCULAR; INTRAVENOUS at 19:18

## 2019-02-01 RX ADMIN — HYDROMORPHONE HYDROCHLORIDE SCH MLS/HR: 10 INJECTION, SOLUTION INTRAMUSCULAR; INTRAVENOUS; SUBCUTANEOUS at 21:33

## 2019-02-01 RX ADMIN — MORPHINE SULFATE PRN MG: 4 INJECTION, SOLUTION INTRAMUSCULAR; INTRAVENOUS at 16:43

## 2019-02-01 RX ADMIN — MORPHINE SULFATE PRN MG: 4 INJECTION, SOLUTION INTRAMUSCULAR; INTRAVENOUS at 05:37

## 2019-02-01 RX ADMIN — MORPHINE SULFATE PRN MG: 4 INJECTION, SOLUTION INTRAMUSCULAR; INTRAVENOUS at 20:22

## 2019-02-01 RX ADMIN — MORPHINE SULFATE PRN MG: 4 INJECTION, SOLUTION INTRAMUSCULAR; INTRAVENOUS at 15:11

## 2019-02-01 RX ADMIN — MORPHINE SULFATE PRN MG: 4 INJECTION, SOLUTION INTRAMUSCULAR; INTRAVENOUS at 17:43

## 2019-02-01 RX ADMIN — MORPHINE SULFATE PRN MG: 4 INJECTION, SOLUTION INTRAMUSCULAR; INTRAVENOUS at 11:57

## 2019-02-01 NOTE — NUR
Problems reprioritized. Patient report given, questions answered & plan of care reviewed 
with IRVING Hall.

## 2019-02-01 NOTE — NUR
Problems reprioritized. Patient report given, questions answered & plan of care reviewed 
with SAKINA VILLATORO.

## 2019-02-01 NOTE — NUR
Problems reprioritized. Patient report given, questions answered & plan of care reviewed 
with Liliana VILLATORO.

## 2019-02-01 NOTE — NUR
PT TRANSFERRED TO ROOM 4016 VIA BED WITH BELONGINGS.  PT'S FAMILY IN ROOM AND AWARE OF 
TRANSFER.  PT'S BELONGINGS GIVEN TO FAMILY.

## 2019-02-01 NOTE — NUR
Reassessment: Tube feedings stopped. Pt extubated to comfort care. No tube 

feedings or PO diet at this time. Will follow per protocol. 

-------------------------------------------------------------------------------

Addendum: 02/01/19 at 1222 by Pat Maier RD

-------------------------------------------------------------------------------

Amended: Links added.

## 2019-02-01 NOTE — NUR
Patient in room CICU 2015. I have received report from Ronny VILLATORO and had the opportunity to 
ask questions and assume patient care.

## 2019-02-01 NOTE — NUR
Patient in room ORTHO 4016. I have received report from IRVING Houser   and had the opportunity 
to ask questions and assume patient care.

## 2019-02-01 NOTE — NUR
ASSUMED CARE, RECEIVED REPORT FROM ISHAAN VILLATORO, PT RESTING COMFORTABLY. I AGREE WITH THE 
PREVIOUS RN ASSESSMENT AND WILL NOTE ANY CHANGES.

## 2019-02-02 VITALS — DIASTOLIC BLOOD PRESSURE: 76 MMHG | SYSTOLIC BLOOD PRESSURE: 172 MMHG

## 2019-02-02 VITALS — SYSTOLIC BLOOD PRESSURE: 173 MMHG | DIASTOLIC BLOOD PRESSURE: 66 MMHG

## 2019-02-02 RX ADMIN — HYDROMORPHONE HYDROCHLORIDE SCH MLS/HR: 10 INJECTION, SOLUTION INTRAMUSCULAR; INTRAVENOUS; SUBCUTANEOUS at 09:00

## 2019-02-02 RX ADMIN — HYDROMORPHONE HYDROCHLORIDE SCH MLS/HR: 10 INJECTION, SOLUTION INTRAMUSCULAR; INTRAVENOUS; SUBCUTANEOUS at 05:00

## 2019-02-02 RX ADMIN — HYDROMORPHONE HYDROCHLORIDE SCH MLS/HR: 10 INJECTION, SOLUTION INTRAMUSCULAR; INTRAVENOUS; SUBCUTANEOUS at 15:00

## 2019-02-02 RX ADMIN — HYDROMORPHONE HYDROCHLORIDE SCH MLS/HR: 10 INJECTION, SOLUTION INTRAMUSCULAR; INTRAVENOUS; SUBCUTANEOUS at 03:00

## 2019-02-02 RX ADMIN — HYDROMORPHONE HYDROCHLORIDE SCH MLS/HR: 10 INJECTION, SOLUTION INTRAMUSCULAR; INTRAVENOUS; SUBCUTANEOUS at 11:00

## 2019-02-02 RX ADMIN — HYDROMORPHONE HYDROCHLORIDE SCH MLS/HR: 10 INJECTION, SOLUTION INTRAMUSCULAR; INTRAVENOUS; SUBCUTANEOUS at 13:00

## 2019-02-02 RX ADMIN — HYDROMORPHONE HYDROCHLORIDE SCH MLS/HR: 10 INJECTION, SOLUTION INTRAMUSCULAR; INTRAVENOUS; SUBCUTANEOUS at 23:00

## 2019-02-02 RX ADMIN — HYDROMORPHONE HYDROCHLORIDE SCH MLS/HR: 10 INJECTION, SOLUTION INTRAMUSCULAR; INTRAVENOUS; SUBCUTANEOUS at 01:00

## 2019-02-02 RX ADMIN — HYDROMORPHONE HYDROCHLORIDE SCH MLS/HR: 10 INJECTION, SOLUTION INTRAMUSCULAR; INTRAVENOUS; SUBCUTANEOUS at 07:00

## 2019-02-02 RX ADMIN — HYDROMORPHONE HYDROCHLORIDE SCH MLS/HR: 10 INJECTION, SOLUTION INTRAMUSCULAR; INTRAVENOUS; SUBCUTANEOUS at 19:00

## 2019-02-02 RX ADMIN — HYDROMORPHONE HYDROCHLORIDE SCH MLS/HR: 10 INJECTION, SOLUTION INTRAMUSCULAR; INTRAVENOUS; SUBCUTANEOUS at 17:00

## 2019-02-02 NOTE — NUR
Patient in room ORTHO 4016. I have received report from IRVING Busby   and had the opportunity to 
ask questions and assume patient care.

## 2019-02-02 NOTE — NUR
Tono had been called regarding increasing of Dilaudid CADD pump.,from 0.4 to 0.6 he gave 
me a order to increased it.

## 2019-02-03 VITALS — SYSTOLIC BLOOD PRESSURE: 127 MMHG | DIASTOLIC BLOOD PRESSURE: 67 MMHG

## 2019-02-03 RX ADMIN — HYDROMORPHONE HYDROCHLORIDE SCH MLS/HR: 10 INJECTION, SOLUTION INTRAMUSCULAR; INTRAVENOUS; SUBCUTANEOUS at 01:00

## 2019-02-03 RX ADMIN — HYDROMORPHONE HYDROCHLORIDE SCH MLS/HR: 10 INJECTION, SOLUTION INTRAMUSCULAR; INTRAVENOUS; SUBCUTANEOUS at 18:55

## 2019-02-03 RX ADMIN — HYDROMORPHONE HYDROCHLORIDE SCH MLS/HR: 10 INJECTION, SOLUTION INTRAMUSCULAR; INTRAVENOUS; SUBCUTANEOUS at 17:00

## 2019-02-03 RX ADMIN — HYDROMORPHONE HYDROCHLORIDE SCH MLS/HR: 10 INJECTION, SOLUTION INTRAMUSCULAR; INTRAVENOUS; SUBCUTANEOUS at 03:00

## 2019-02-03 RX ADMIN — HYDROMORPHONE HYDROCHLORIDE SCH MLS/HR: 10 INJECTION, SOLUTION INTRAMUSCULAR; INTRAVENOUS; SUBCUTANEOUS at 05:00

## 2019-02-03 RX ADMIN — HYDROMORPHONE HYDROCHLORIDE SCH MLS/HR: 10 INJECTION, SOLUTION INTRAMUSCULAR; INTRAVENOUS; SUBCUTANEOUS at 09:00

## 2019-02-03 RX ADMIN — HYDROMORPHONE HYDROCHLORIDE SCH MLS/HR: 10 INJECTION, SOLUTION INTRAMUSCULAR; INTRAVENOUS; SUBCUTANEOUS at 07:00

## 2019-02-03 RX ADMIN — HYDROMORPHONE HYDROCHLORIDE SCH MLS/HR: 10 INJECTION, SOLUTION INTRAMUSCULAR; INTRAVENOUS; SUBCUTANEOUS at 11:00

## 2019-02-03 RX ADMIN — HYDROMORPHONE HYDROCHLORIDE SCH MLS/HR: 10 INJECTION, SOLUTION INTRAMUSCULAR; INTRAVENOUS; SUBCUTANEOUS at 15:00

## 2019-02-03 RX ADMIN — HYDROMORPHONE HYDROCHLORIDE SCH MLS/HR: 10 INJECTION, SOLUTION INTRAMUSCULAR; INTRAVENOUS; SUBCUTANEOUS at 13:00

## 2019-02-03 NOTE — NUR
RN IS TO DOCUMENT YES TO ALL APPLICABLE AREAS

Pronouncement of Death:

1. Time Physician Notified:2010 [employer] hospitalist Behl notified 2110

2. Date of Death:2/3/2019 

3. Time of Death: @2035

4. DNR/Withdraw life support documented:Comfort care

5. Monitor strip has been placed on chart:yes

6. Assessment process is of one-minute duration and includes following criteria:

    a) Patient is unresponsive to all stimuli: yes

    b) Pupils fixed and non-reactive:yes

    c) Auscultation of precordium reveals absence of heart tones:yes

    d) Auscultation of lungs reveals absence of breath sounds:yes

    e) Absence of blood pressure / all vital signs:yes

    f) QRS complexes are not present on monitor / EKG strip:not present

    g) Pacer spikes without capture:none

4. Comments:EKG strip in chart Asystole, ian's called per pt's family request. pt's son 
Shukri Eldridge called family to notified death.

## 2019-02-03 NOTE — NUR
[facility] was called to give an update for reference number 
[id number], spoke with Festus. Cleared to transfer patient to care of mortuary of family 
choice.

## 2019-02-03 NOTE — NUR
Call placed to [facility] services by request of family. They 
stated they would be here in roughly 40 mins to an hour. Primary care nurse notified.